# Patient Record
Sex: FEMALE | Race: WHITE | NOT HISPANIC OR LATINO
[De-identification: names, ages, dates, MRNs, and addresses within clinical notes are randomized per-mention and may not be internally consistent; named-entity substitution may affect disease eponyms.]

---

## 2017-03-06 ENCOUNTER — RX RENEWAL (OUTPATIENT)
Age: 72
End: 2017-03-06

## 2017-03-21 ENCOUNTER — APPOINTMENT (OUTPATIENT)
Dept: ENDOCRINOLOGY | Facility: CLINIC | Age: 72
End: 2017-03-21

## 2017-03-21 VITALS
WEIGHT: 152 LBS | HEART RATE: 88 BPM | HEIGHT: 62 IN | OXYGEN SATURATION: 95 % | BODY MASS INDEX: 27.97 KG/M2 | DIASTOLIC BLOOD PRESSURE: 70 MMHG | SYSTOLIC BLOOD PRESSURE: 130 MMHG

## 2017-03-21 DIAGNOSIS — I10 ESSENTIAL (PRIMARY) HYPERTENSION: ICD-10-CM

## 2017-03-21 DIAGNOSIS — E78.5 HYPERLIPIDEMIA, UNSPECIFIED: ICD-10-CM

## 2017-03-21 DIAGNOSIS — E11.65 TYPE 2 DIABETES MELLITUS WITH HYPERGLYCEMIA: ICD-10-CM

## 2017-03-21 LAB
GLUCOSE BLDC GLUCOMTR-MCNC: 125
HBA1C MFR BLD HPLC: 8

## 2017-03-29 ENCOUNTER — APPOINTMENT (OUTPATIENT)
Dept: CARDIOLOGY | Facility: CLINIC | Age: 72
End: 2017-03-29

## 2017-03-29 ENCOUNTER — NON-APPOINTMENT (OUTPATIENT)
Age: 72
End: 2017-03-29

## 2017-03-29 VITALS
HEART RATE: 77 BPM | DIASTOLIC BLOOD PRESSURE: 80 MMHG | BODY MASS INDEX: 27.97 KG/M2 | OXYGEN SATURATION: 100 % | SYSTOLIC BLOOD PRESSURE: 164 MMHG | HEIGHT: 62 IN | WEIGHT: 152 LBS

## 2017-05-02 ENCOUNTER — RX RENEWAL (OUTPATIENT)
Age: 72
End: 2017-05-02

## 2017-06-06 ENCOUNTER — APPOINTMENT (OUTPATIENT)
Dept: ENDOCRINOLOGY | Facility: CLINIC | Age: 72
End: 2017-06-06

## 2017-06-29 ENCOUNTER — RX RENEWAL (OUTPATIENT)
Age: 72
End: 2017-06-29

## 2017-09-25 ENCOUNTER — RX RENEWAL (OUTPATIENT)
Age: 72
End: 2017-09-25

## 2017-11-28 ENCOUNTER — RX RENEWAL (OUTPATIENT)
Age: 72
End: 2017-11-28

## 2018-04-04 ENCOUNTER — APPOINTMENT (OUTPATIENT)
Dept: CARDIOLOGY | Facility: CLINIC | Age: 73
End: 2018-04-04
Payer: MEDICARE

## 2018-04-04 ENCOUNTER — NON-APPOINTMENT (OUTPATIENT)
Age: 73
End: 2018-04-04

## 2018-04-04 VITALS
HEART RATE: 84 BPM | RESPIRATION RATE: 17 BRPM | DIASTOLIC BLOOD PRESSURE: 75 MMHG | OXYGEN SATURATION: 97 % | SYSTOLIC BLOOD PRESSURE: 172 MMHG

## 2018-04-04 VITALS
DIASTOLIC BLOOD PRESSURE: 82 MMHG | HEART RATE: 82 BPM | SYSTOLIC BLOOD PRESSURE: 161 MMHG | WEIGHT: 153 LBS | OXYGEN SATURATION: 97 % | HEIGHT: 61 IN | BODY MASS INDEX: 28.89 KG/M2 | RESPIRATION RATE: 17 BRPM

## 2018-04-04 VITALS — RESPIRATION RATE: 17 BRPM

## 2018-04-04 PROCEDURE — 99214 OFFICE O/P EST MOD 30 MIN: CPT

## 2018-04-04 PROCEDURE — 93000 ELECTROCARDIOGRAM COMPLETE: CPT

## 2018-04-04 RX ORDER — ASPIRIN 81 MG/1
81 TABLET, CHEWABLE ORAL DAILY
Qty: 1 | Refills: 3 | Status: ACTIVE | COMMUNITY
Start: 2018-04-04 | End: 1900-01-01

## 2018-04-04 RX ORDER — LISINOPRIL 40 MG/1
40 TABLET ORAL DAILY
Qty: 90 | Refills: 3 | Status: ACTIVE | COMMUNITY
Start: 1900-01-01 | End: 1900-01-01

## 2018-04-06 LAB
ALBUMIN SERPL ELPH-MCNC: 4.1 G/DL
ALP BLD-CCNC: 62 U/L
ALT SERPL-CCNC: 14 U/L
ANION GAP SERPL CALC-SCNC: 14 MMOL/L
AST SERPL-CCNC: 15 U/L
BILIRUB SERPL-MCNC: 0.3 MG/DL
BUN SERPL-MCNC: 30 MG/DL
CALCIUM SERPL-MCNC: 9.9 MG/DL
CHLORIDE SERPL-SCNC: 105 MMOL/L
CO2 SERPL-SCNC: 23 MMOL/L
CREAT SERPL-MCNC: 1.17 MG/DL
GLUCOSE SERPL-MCNC: 231 MG/DL
POTASSIUM SERPL-SCNC: 5.2 MMOL/L
PROT SERPL-MCNC: 7.7 G/DL
SODIUM SERPL-SCNC: 142 MMOL/L

## 2018-04-17 ENCOUNTER — RX RENEWAL (OUTPATIENT)
Age: 73
End: 2018-04-17

## 2018-07-27 ENCOUNTER — RX RENEWAL (OUTPATIENT)
Age: 73
End: 2018-07-27

## 2018-07-27 RX ORDER — ATORVASTATIN CALCIUM 10 MG/1
10 TABLET, FILM COATED ORAL
Qty: 30 | Refills: 3 | Status: ACTIVE | COMMUNITY
Start: 2018-04-06 | End: 1900-01-01

## 2018-09-19 ENCOUNTER — RX RENEWAL (OUTPATIENT)
Age: 73
End: 2018-09-19

## 2018-09-19 RX ORDER — CARVEDILOL 6.25 MG/1
6.25 TABLET, FILM COATED ORAL TWICE DAILY
Qty: 180 | Refills: 1 | Status: ACTIVE | COMMUNITY
Start: 2017-03-29 | End: 1900-01-01

## 2018-11-14 ENCOUNTER — APPOINTMENT (OUTPATIENT)
Dept: CARDIOLOGY | Facility: CLINIC | Age: 73
End: 2018-11-14
Payer: MEDICARE

## 2018-11-14 ENCOUNTER — NON-APPOINTMENT (OUTPATIENT)
Age: 73
End: 2018-11-14

## 2018-11-14 VITALS
BODY MASS INDEX: 28.32 KG/M2 | WEIGHT: 150 LBS | HEART RATE: 82 BPM | OXYGEN SATURATION: 100 % | SYSTOLIC BLOOD PRESSURE: 168 MMHG | DIASTOLIC BLOOD PRESSURE: 76 MMHG | HEIGHT: 61 IN

## 2018-11-14 PROCEDURE — 93000 ELECTROCARDIOGRAM COMPLETE: CPT

## 2018-11-14 PROCEDURE — 99214 OFFICE O/P EST MOD 30 MIN: CPT

## 2018-11-14 NOTE — PHYSICAL EXAM
[General Appearance - Well Developed] : well developed [General Appearance - Well Nourished] : well nourished [Normal Conjunctiva] : the conjunctiva exhibited no abnormalities [Normal Oral Mucosa] : normal oral mucosa [Normal Oropharynx] : normal oropharynx [Normal Jugular Venous V Waves Present] : normal jugular venous V waves present [Respiration, Rhythm And Depth] : normal respiratory rhythm and effort [Heart Rate And Rhythm] : heart rate and rhythm were normal [Heart Sounds] : normal S1 and S2 [Bowel Sounds] : normal bowel sounds [Abdomen Soft] : soft [Abdomen Tenderness] : non-tender [Abnormal Walk] : normal gait [Nail Clubbing] : no clubbing of the fingernails [Cyanosis, Localized] : no localized cyanosis [FreeTextEntry1] : Right radial access site is normal pulses one plus with no hematoma noted no muscle compromise. Mild bruising but overall looks very good [Skin Color & Pigmentation] : normal skin color and pigmentation [Skin Turgor] : normal skin turgor [] : no rash [Oriented To Time, Place, And Person] : oriented to person, place, and time [Impaired Insight] : insight and judgment were intact [No Anxiety] : not feeling anxious

## 2018-11-14 NOTE — DISCUSSION/SUMMARY
[FreeTextEntry1] : #1 CAD: asymptomatic. Continue with aspirin and aggressive risk factor modification. On low dose lipitor as she has elevated LFTS with high dose./ \par \par #2 hypertension:Blood pressure is elevated,patient states that she is always nervous in Drs office. At home BP was ok.  will continue current therapy, low salt diet and fu PCP . she will maintain home BP logs\par \par #Type 2 diabetes: followup with endocrinologist\par

## 2018-11-14 NOTE — HISTORY OF PRESENT ILLNESS
[FreeTextEntry1] : 73-year-old female with history of hypertension and type 2 diabetes. 2014 She was admitted to [facility] with non-ST elevation MI. Cardiac catheter showed severe stenosis of right coronary artery that was treated with drug-eluting stents.\par \par On today's visit patient states that she is doing well with no active symptoms of angina, shortness of breath, palpitations, syncope or presyncope. Does 10K steps everyday\par \par  BP is still high, there is also concern related to high salt diet.

## 2019-04-10 ENCOUNTER — EMERGENCY (EMERGENCY)
Facility: HOSPITAL | Age: 74
LOS: 1 days | Discharge: HOME | End: 2019-04-10
Attending: EMERGENCY MEDICINE | Admitting: EMERGENCY MEDICINE
Payer: MEDICARE

## 2019-04-10 VITALS
TEMPERATURE: 98 F | RESPIRATION RATE: 16 BRPM | HEART RATE: 92 BPM | OXYGEN SATURATION: 95 % | DIASTOLIC BLOOD PRESSURE: 71 MMHG | SYSTOLIC BLOOD PRESSURE: 160 MMHG

## 2019-04-10 VITALS
HEART RATE: 89 BPM | OXYGEN SATURATION: 98 % | RESPIRATION RATE: 15 BRPM | TEMPERATURE: 99 F | WEIGHT: 149.91 LBS | HEIGHT: 62 IN | DIASTOLIC BLOOD PRESSURE: 82 MMHG | SYSTOLIC BLOOD PRESSURE: 174 MMHG

## 2019-04-10 PROCEDURE — 99284 EMERGENCY DEPT VISIT MOD MDM: CPT

## 2019-04-10 PROCEDURE — 70450 CT HEAD/BRAIN W/O DYE: CPT | Mod: 26

## 2019-04-10 PROCEDURE — 72125 CT NECK SPINE W/O DYE: CPT

## 2019-04-10 PROCEDURE — 71250 CT THORAX DX C-: CPT

## 2019-04-10 PROCEDURE — 72125 CT NECK SPINE W/O DYE: CPT | Mod: 26

## 2019-04-10 PROCEDURE — 71250 CT THORAX DX C-: CPT | Mod: 26

## 2019-04-10 PROCEDURE — 73110 X-RAY EXAM OF WRIST: CPT | Mod: 26,RT

## 2019-04-10 PROCEDURE — 90715 TDAP VACCINE 7 YRS/> IM: CPT

## 2019-04-10 PROCEDURE — 99284 EMERGENCY DEPT VISIT MOD MDM: CPT | Mod: 25

## 2019-04-10 PROCEDURE — 70450 CT HEAD/BRAIN W/O DYE: CPT

## 2019-04-10 PROCEDURE — 90471 IMMUNIZATION ADMIN: CPT

## 2019-04-10 PROCEDURE — 73110 X-RAY EXAM OF WRIST: CPT

## 2019-04-10 PROCEDURE — 70480 CT ORBIT/EAR/FOSSA W/O DYE: CPT

## 2019-04-10 RX ORDER — TETANUS TOXOID, REDUCED DIPHTHERIA TOXOID AND ACELLULAR PERTUSSIS VACCINE, ADSORBED 5; 2.5; 8; 8; 2.5 [IU]/.5ML; [IU]/.5ML; UG/.5ML; UG/.5ML; UG/.5ML
0.5 SUSPENSION INTRAMUSCULAR ONCE
Qty: 0 | Refills: 0 | Status: COMPLETED | OUTPATIENT
Start: 2019-04-10 | End: 2019-04-10

## 2019-04-10 RX ORDER — ACETAMINOPHEN 500 MG
975 TABLET ORAL ONCE
Qty: 0 | Refills: 0 | Status: COMPLETED | OUTPATIENT
Start: 2019-04-10 | End: 2019-04-10

## 2019-04-10 RX ADMIN — TETANUS TOXOID, REDUCED DIPHTHERIA TOXOID AND ACELLULAR PERTUSSIS VACCINE, ADSORBED 0.5 MILLILITER(S): 5; 2.5; 8; 8; 2.5 SUSPENSION INTRAMUSCULAR at 23:16

## 2019-04-10 RX ADMIN — Medication 975 MILLIGRAM(S): at 22:50

## 2019-04-10 RX ADMIN — Medication 975 MILLIGRAM(S): at 23:05

## 2019-04-10 NOTE — ED PROVIDER NOTE - PROGRESS NOTE DETAILS
giving tylenol now, having more pain from left periorbital hematoma; also family now request tetanus shot, probably years since last and has abrasions hands and right foot from fall outside, so want the vaccine

## 2019-04-10 NOTE — ED PROVIDER NOTE - OBJECTIVE STATEMENT
72 y/o female with a PMHx of HTN, DM, CAD presents to the ED s/p fall today. Pt was taking the garbage out, usually doesn't take out garbage. Pt has a steep 2 car driveway, per son pt probably tripped and fell, and tumbled alf down the driveway. No LOC, was able to ambulate back into house. Family members were in the house when pt fell. Now c/o left-sided facial bruising and edema, left-sided chest pain, abrasion on bilateral knees. Some neck discomfort. Denies back pain. Probably still on blood thinners. Hx of lasic eye surgery. Nonsmoker.

## 2019-04-10 NOTE — ED ADULT NURSE NOTE - NSIMPLEMENTINTERV_GEN_ALL_ED
Implemented All Fall Risk Interventions:  [place] to call system. Call bell, personal items and telephone within reach. Instruct patient to call for assistance. Room bathroom lighting operational. Non-slip footwear when patient is off stretcher. Physically safe environment: no spills, clutter or unnecessary equipment. Stretcher in lowest position, wheels locked, appropriate side rails in place. Provide visual cue, wrist band, yellow gown, etc. Monitor gait and stability. Monitor for mental status changes and reorient to person, place, and time. Review medications for side effects contributing to fall risk. Reinforce activity limits and safety measures with patient and family.

## 2019-04-10 NOTE — ED ADULT TRIAGE NOTE - CHIEF COMPLAINT QUOTE
as per pt's son, patient lost her balance and fell, hit her left side face on the drive way, noted a large hematoma on her face. also c/o left side pain

## 2019-04-10 NOTE — ED PROVIDER NOTE - CARE PLAN
Principal Discharge DX:	Periorbital hematoma of left eye  Secondary Diagnosis:	Contusion of chest wall, left, initial encounter  Secondary Diagnosis:	Abrasions of multiple sites

## 2019-11-27 ENCOUNTER — APPOINTMENT (OUTPATIENT)
Dept: CARDIOLOGY | Facility: CLINIC | Age: 74
End: 2019-11-27
Payer: MEDICARE

## 2019-11-27 ENCOUNTER — NON-APPOINTMENT (OUTPATIENT)
Age: 74
End: 2019-11-27

## 2019-11-27 VITALS
SYSTOLIC BLOOD PRESSURE: 164 MMHG | HEART RATE: 80 BPM | HEIGHT: 61 IN | OXYGEN SATURATION: 98 % | DIASTOLIC BLOOD PRESSURE: 73 MMHG | WEIGHT: 149 LBS | BODY MASS INDEX: 28.13 KG/M2

## 2019-11-27 PROCEDURE — 93000 ELECTROCARDIOGRAM COMPLETE: CPT

## 2019-11-27 PROCEDURE — 99214 OFFICE O/P EST MOD 30 MIN: CPT

## 2019-11-27 NOTE — DISCUSSION/SUMMARY
[FreeTextEntry1] : #1 CAD: atypical symptoms, will get NST to assess for ischemia. \par \par #2 hypertension:Blood pressure is elevated,patient states that she is always nervous in Drs office. At home BP was ok.  will continue current therapy, low salt diet and fu PCP . she will maintain home BP logs\par \par #Type 2 diabetes: followup with endocrinologist\par

## 2019-11-27 NOTE — HISTORY OF PRESENT ILLNESS
[FreeTextEntry1] : 74-year-old female with history of hypertension and type 2 diabetes. 2014 She was admitted to [facility] with non-ST elevation MI. Cardiac catheter showed severe stenosis of right coronary artery that was treated with drug-eluting stents.\par \par On today's visit patient states she gest on-off left left side chest pain, dull in nature and ocassionally she gets SOb withj walking. \par \par  BP is still high, there is also concern related to high salt diet. /71

## 2019-12-23 ENCOUNTER — APPOINTMENT (OUTPATIENT)
Dept: CV DIAGNOSTICS | Facility: HOSPITAL | Age: 74
End: 2019-12-23
Payer: MEDICARE

## 2019-12-23 ENCOUNTER — OUTPATIENT (OUTPATIENT)
Dept: OUTPATIENT SERVICES | Facility: HOSPITAL | Age: 74
LOS: 1 days | End: 2019-12-23

## 2019-12-23 DIAGNOSIS — I25.10 ATHEROSCLEROTIC HEART DISEASE OF NATIVE CORONARY ARTERY WITHOUT ANGINA PECTORIS: ICD-10-CM

## 2019-12-23 PROCEDURE — 93018 CV STRESS TEST I&R ONLY: CPT | Mod: GC

## 2019-12-23 PROCEDURE — 93016 CV STRESS TEST SUPVJ ONLY: CPT | Mod: GC

## 2019-12-23 PROCEDURE — 78452 HT MUSCLE IMAGE SPECT MULT: CPT | Mod: 26

## 2019-12-30 ENCOUNTER — EMERGENCY (EMERGENCY)
Facility: HOSPITAL | Age: 74
LOS: 1 days | Discharge: HOME | End: 2019-12-30
Attending: EMERGENCY MEDICINE | Admitting: EMERGENCY MEDICINE
Payer: MEDICARE

## 2019-12-30 VITALS
OXYGEN SATURATION: 99 % | TEMPERATURE: 98 F | DIASTOLIC BLOOD PRESSURE: 62 MMHG | RESPIRATION RATE: 16 BRPM | SYSTOLIC BLOOD PRESSURE: 153 MMHG | HEART RATE: 71 BPM

## 2019-12-30 VITALS
HEART RATE: 72 BPM | DIASTOLIC BLOOD PRESSURE: 90 MMHG | SYSTOLIC BLOOD PRESSURE: 209 MMHG | OXYGEN SATURATION: 99 % | WEIGHT: 145.06 LBS | HEIGHT: 61 IN | TEMPERATURE: 98 F | RESPIRATION RATE: 18 BRPM

## 2019-12-30 LAB
ALBUMIN SERPL ELPH-MCNC: 3.8 G/DL — SIGNIFICANT CHANGE UP (ref 3.3–5)
ALP SERPL-CCNC: 76 U/L — SIGNIFICANT CHANGE UP (ref 30–120)
ALT FLD-CCNC: 19 U/L DA — SIGNIFICANT CHANGE UP (ref 10–60)
ANION GAP SERPL CALC-SCNC: 9 MMOL/L — SIGNIFICANT CHANGE UP (ref 5–17)
AST SERPL-CCNC: 24 U/L — SIGNIFICANT CHANGE UP (ref 10–40)
BASOPHILS # BLD AUTO: 0.02 K/UL — SIGNIFICANT CHANGE UP (ref 0–0.2)
BASOPHILS NFR BLD AUTO: 0.5 % — SIGNIFICANT CHANGE UP (ref 0–2)
BILIRUB SERPL-MCNC: 0.4 MG/DL — SIGNIFICANT CHANGE UP (ref 0.2–1.2)
BUN SERPL-MCNC: 29 MG/DL — HIGH (ref 7–23)
CALCIUM SERPL-MCNC: 9 MG/DL — SIGNIFICANT CHANGE UP (ref 8.4–10.5)
CHLORIDE SERPL-SCNC: 102 MMOL/L — SIGNIFICANT CHANGE UP (ref 96–108)
CO2 SERPL-SCNC: 26 MMOL/L — SIGNIFICANT CHANGE UP (ref 22–31)
CREAT SERPL-MCNC: 1.1 MG/DL — SIGNIFICANT CHANGE UP (ref 0.5–1.3)
EOSINOPHIL # BLD AUTO: 0.18 K/UL — SIGNIFICANT CHANGE UP (ref 0–0.5)
EOSINOPHIL NFR BLD AUTO: 4.3 % — SIGNIFICANT CHANGE UP (ref 0–6)
GLUCOSE BLDC GLUCOMTR-MCNC: 127 MG/DL — HIGH (ref 70–99)
GLUCOSE SERPL-MCNC: 134 MG/DL — HIGH (ref 70–99)
HCT VFR BLD CALC: 34.5 % — SIGNIFICANT CHANGE UP (ref 34.5–45)
HGB BLD-MCNC: 11 G/DL — LOW (ref 11.5–15.5)
IMM GRANULOCYTES NFR BLD AUTO: 0.2 % — SIGNIFICANT CHANGE UP (ref 0–1.5)
LYMPHOCYTES # BLD AUTO: 1.39 K/UL — SIGNIFICANT CHANGE UP (ref 1–3.3)
LYMPHOCYTES # BLD AUTO: 32.9 % — SIGNIFICANT CHANGE UP (ref 13–44)
MAGNESIUM SERPL-MCNC: 1.7 MG/DL — SIGNIFICANT CHANGE UP (ref 1.6–2.6)
MCHC RBC-ENTMCNC: 30.3 PG — SIGNIFICANT CHANGE UP (ref 27–34)
MCHC RBC-ENTMCNC: 31.9 GM/DL — LOW (ref 32–36)
MCV RBC AUTO: 95 FL — SIGNIFICANT CHANGE UP (ref 80–100)
MONOCYTES # BLD AUTO: 0.32 K/UL — SIGNIFICANT CHANGE UP (ref 0–0.9)
MONOCYTES NFR BLD AUTO: 7.6 % — SIGNIFICANT CHANGE UP (ref 2–14)
NEUTROPHILS # BLD AUTO: 2.31 K/UL — SIGNIFICANT CHANGE UP (ref 1.8–7.4)
NEUTROPHILS NFR BLD AUTO: 54.5 % — SIGNIFICANT CHANGE UP (ref 43–77)
NRBC # BLD: 0 /100 WBCS — SIGNIFICANT CHANGE UP (ref 0–0)
PHOSPHATE SERPL-MCNC: 3.5 MG/DL — SIGNIFICANT CHANGE UP (ref 2.5–4.5)
PLATELET # BLD AUTO: 214 K/UL — SIGNIFICANT CHANGE UP (ref 150–400)
POTASSIUM SERPL-MCNC: 5.1 MMOL/L — SIGNIFICANT CHANGE UP (ref 3.5–5.3)
POTASSIUM SERPL-SCNC: 5.1 MMOL/L — SIGNIFICANT CHANGE UP (ref 3.5–5.3)
PROT SERPL-MCNC: 8.1 G/DL — SIGNIFICANT CHANGE UP (ref 6–8.3)
RBC # BLD: 3.63 M/UL — LOW (ref 3.8–5.2)
RBC # FLD: 13.2 % — SIGNIFICANT CHANGE UP (ref 10.3–14.5)
SODIUM SERPL-SCNC: 137 MMOL/L — SIGNIFICANT CHANGE UP (ref 135–145)
TROPONIN I SERPL-MCNC: 0 NG/ML — LOW (ref 0.02–0.06)
WBC # BLD: 4.23 K/UL — SIGNIFICANT CHANGE UP (ref 3.8–10.5)
WBC # FLD AUTO: 4.23 K/UL — SIGNIFICANT CHANGE UP (ref 3.8–10.5)

## 2019-12-30 PROCEDURE — 36415 COLL VENOUS BLD VENIPUNCTURE: CPT

## 2019-12-30 PROCEDURE — 96374 THER/PROPH/DIAG INJ IV PUSH: CPT

## 2019-12-30 PROCEDURE — 82962 GLUCOSE BLOOD TEST: CPT

## 2019-12-30 PROCEDURE — 70450 CT HEAD/BRAIN W/O DYE: CPT | Mod: 26

## 2019-12-30 PROCEDURE — 84484 ASSAY OF TROPONIN QUANT: CPT

## 2019-12-30 PROCEDURE — 99284 EMERGENCY DEPT VISIT MOD MDM: CPT | Mod: 25

## 2019-12-30 PROCEDURE — 99284 EMERGENCY DEPT VISIT MOD MDM: CPT

## 2019-12-30 PROCEDURE — 93010 ELECTROCARDIOGRAM REPORT: CPT

## 2019-12-30 PROCEDURE — 96361 HYDRATE IV INFUSION ADD-ON: CPT

## 2019-12-30 PROCEDURE — 84100 ASSAY OF PHOSPHORUS: CPT

## 2019-12-30 PROCEDURE — 80053 COMPREHEN METABOLIC PANEL: CPT

## 2019-12-30 PROCEDURE — 93005 ELECTROCARDIOGRAM TRACING: CPT

## 2019-12-30 PROCEDURE — 85027 COMPLETE CBC AUTOMATED: CPT

## 2019-12-30 PROCEDURE — 83735 ASSAY OF MAGNESIUM: CPT

## 2019-12-30 PROCEDURE — 70450 CT HEAD/BRAIN W/O DYE: CPT

## 2019-12-30 RX ORDER — ATORVASTATIN CALCIUM 80 MG/1
1 TABLET, FILM COATED ORAL
Qty: 0 | Refills: 0 | DISCHARGE

## 2019-12-30 RX ORDER — MECLIZINE HCL 12.5 MG
1 TABLET ORAL
Qty: 20 | Refills: 0
Start: 2019-12-30

## 2019-12-30 RX ORDER — CLOPIDOGREL BISULFATE 75 MG/1
1 TABLET, FILM COATED ORAL
Qty: 0 | Refills: 0 | DISCHARGE

## 2019-12-30 RX ORDER — ONDANSETRON 8 MG/1
4 TABLET, FILM COATED ORAL ONCE
Refills: 0 | Status: COMPLETED | OUTPATIENT
Start: 2019-12-30 | End: 2019-12-30

## 2019-12-30 RX ORDER — ASPIRIN/CALCIUM CARB/MAGNESIUM 324 MG
1 TABLET ORAL
Qty: 0 | Refills: 0 | DISCHARGE

## 2019-12-30 RX ORDER — CARVEDILOL PHOSPHATE 80 MG/1
1 CAPSULE, EXTENDED RELEASE ORAL
Qty: 0 | Refills: 0 | DISCHARGE

## 2019-12-30 RX ORDER — SAXAGLIPTIN 5 MG/1
1 TABLET, FILM COATED ORAL
Qty: 0 | Refills: 0 | DISCHARGE

## 2019-12-30 RX ORDER — SODIUM CHLORIDE 9 MG/ML
1000 INJECTION INTRAMUSCULAR; INTRAVENOUS; SUBCUTANEOUS ONCE
Refills: 0 | Status: COMPLETED | OUTPATIENT
Start: 2019-12-30 | End: 2019-12-30

## 2019-12-30 RX ORDER — MECLIZINE HCL 12.5 MG
25 TABLET ORAL ONCE
Refills: 0 | Status: COMPLETED | OUTPATIENT
Start: 2019-12-30 | End: 2019-12-30

## 2019-12-30 RX ORDER — LISINOPRIL 2.5 MG/1
1 TABLET ORAL
Qty: 0 | Refills: 0 | DISCHARGE

## 2019-12-30 RX ORDER — METFORMIN HYDROCHLORIDE 850 MG/1
1 TABLET ORAL
Qty: 0 | Refills: 0 | DISCHARGE

## 2019-12-30 RX ADMIN — Medication 25 MILLIGRAM(S): at 10:08

## 2019-12-30 RX ADMIN — SODIUM CHLORIDE 1000 MILLILITER(S): 9 INJECTION INTRAMUSCULAR; INTRAVENOUS; SUBCUTANEOUS at 11:10

## 2019-12-30 RX ADMIN — SODIUM CHLORIDE 1000 MILLILITER(S): 9 INJECTION INTRAMUSCULAR; INTRAVENOUS; SUBCUTANEOUS at 10:08

## 2019-12-30 RX ADMIN — ONDANSETRON 4 MILLIGRAM(S): 8 TABLET, FILM COATED ORAL at 10:08

## 2019-12-30 NOTE — ED PROVIDER NOTE - CLINICAL SUMMARY MEDICAL DECISION MAKING FREE TEXT BOX
Dr. Milner Note: acute vertigo but in a patient with high risk factors for vascular event given HTN, DM, CAD, and elderly state.  Will check lytes, troponin, and ct head, and refer to neuro for possible MRI imaging but otherwise consider more BPPV.

## 2019-12-30 NOTE — ED PROVIDER NOTE - NSFOLLOWUPINSTRUCTIONS_ED_ALL_ED_FT
1. Take Meclizine 25mg every 6 hours for dizziness.   2. Follow up PCP in 1-2 days for re-evaluation.  3. Follow up Neurologist if dizziness returns/persists to exclude occult stroke.  Today's exam was negative for stroke but you are high risk so would recommend further work up and risk reduction by your doctor and neurologist.  4. Return for any concerns.

## 2019-12-30 NOTE — ED PROVIDER NOTE - OBJECTIVE STATEMENT
Dr. Milner Note: 74F here with daughter, walk-in, presents with acute vertigo, onset 8hrs ago, acute, associated with nausea.  Also noted SBP went from 160 to 200 at home.  Daughter incorrectly states pt had a prior stroke with similar symptoms (clarified that pt had angioplasty and 2 stents for CAD with symptoms of nausea and heartburn).  Pt currently without chest pain, dyspnea, or palpitations.  Vertigo only present with head movements or getting up, and resolved with lying still.  States she's on a "blood thinner" but has no list of medications.

## 2019-12-30 NOTE — ED PROVIDER NOTE - PATIENT PORTAL LINK FT
You can access the FollowMyHealth Patient Portal offered by [facility] by registering at the following website: [email or web address]. By joining [employer]’s FollowMyHealth portal, you will also be able to view your health information using other applications (apps) compatible with our system.

## 2019-12-30 NOTE — ED ADULT NURSE NOTE - NSIMPLEMENTINTERV_GEN_ALL_ED
Implemented All Fall with Harm Risk Interventions:  [place] to call system. Call bell, personal items and telephone within reach. Instruct patient to call for assistance. Room bathroom lighting operational. Non-slip footwear when patient is off stretcher. Physically safe environment: no spills, clutter or unnecessary equipment. Stretcher in lowest position, wheels locked, appropriate side rails in place. Provide visual cue, wrist band, yellow gown, etc. Monitor gait and stability. Monitor for mental status changes and reorient to person, place, and time. Review medications for side effects contributing to fall risk. Reinforce activity limits and safety measures with patient and family. Provide visual clues: red socks.

## 2019-12-30 NOTE — ED ADULT NURSE NOTE - OBJECTIVE STATEMENT
Amb to ED with her daughter who states pt became dizzy about 12M with nausea- no vomiting. Color fair. Skin warm & dry to touch. Lungs clear. ALLISON freely

## 2019-12-30 NOTE — ED PROVIDER NOTE - CARE PLAN
Principal Discharge DX:	BPPV (benign paroxysmal positional vertigo), left  Secondary Diagnosis:	Hypertension

## 2022-12-17 ENCOUNTER — OFFICE (OUTPATIENT)
Dept: URBAN - METROPOLITAN AREA CLINIC 109 | Facility: CLINIC | Age: 77
Setting detail: OPHTHALMOLOGY
End: 2022-12-17
Payer: MEDICARE

## 2022-12-17 VITALS — HEIGHT: 55 IN

## 2022-12-17 DIAGNOSIS — H01.002: ICD-10-CM

## 2022-12-17 DIAGNOSIS — H40.053: ICD-10-CM

## 2022-12-17 DIAGNOSIS — H01.004: ICD-10-CM

## 2022-12-17 DIAGNOSIS — H01.005: ICD-10-CM

## 2022-12-17 DIAGNOSIS — H01.001: ICD-10-CM

## 2022-12-17 DIAGNOSIS — E11.3591: ICD-10-CM

## 2022-12-17 DIAGNOSIS — E11.3592: ICD-10-CM

## 2022-12-17 PROCEDURE — 92014 COMPRE OPH EXAM EST PT 1/>: CPT | Performed by: OPHTHALMOLOGY

## 2022-12-17 PROCEDURE — 92134 CPTRZ OPH DX IMG PST SGM RTA: CPT | Performed by: OPHTHALMOLOGY

## 2022-12-17 ASSESSMENT — REFRACTION_AUTOREFRACTION
OS_AXIS: 69
OD_CYLINDER: -1.00
OS_SPHERE: +1.50
OD_SPHERE: +1.50
OS_CYLINDER: -0.75
OD_AXIS: 101

## 2022-12-17 ASSESSMENT — TONOMETRY
OS_IOP_MMHG: 19
OD_IOP_MMHG: 19

## 2022-12-17 ASSESSMENT — PACHYMETRY
OS_CT_UM: 603
OD_CT_CORRECTION: -1
OS_CT_CORRECTION: -4
OD_CT_UM: 565

## 2022-12-17 ASSESSMENT — KERATOMETRY
OD_AXISANGLE_DEGREES: 158
OS_K1POWER_DIOPTERS: 42.87
OS_K2POWER_DIOPTERS: 44.25
OD_K2POWER_DIOPTERS: 44.00
OS_AXISANGLE_DEGREES: 3
OD_K1POWER_DIOPTERS: 43.62

## 2022-12-17 ASSESSMENT — LID EXAM ASSESSMENTS
OS_MEIBOMITIS: 1+
OD_BLEPHARITIS: 2+
OS_BLEPHARITIS: 2+
OD_MEIBOMITIS: 1+

## 2022-12-17 ASSESSMENT — REFRACTION_CURRENTRX
OS_OVR_VA: 20/
OD_ADD: +1.75
OD_OVR_VA: 20/
OS_AXIS: 102
OD_AXIS: 76
OS_SPHERE: +1.50
OD_CYLINDER: -1.50
OD_SPHERE: +2.50
OS_CYLINDER: -0.75
OS_ADD: +1.75

## 2022-12-17 ASSESSMENT — AXIALLENGTH_DERIVED
OS_AL: 23.1404
OD_AL: 23.099

## 2022-12-17 ASSESSMENT — VISUAL ACUITY
OD_BCVA: 20/20
OS_BCVA: 20/30

## 2022-12-17 ASSESSMENT — CONFRONTATIONAL VISUAL FIELD TEST (CVF)
OS_FINDINGS: FULL
OD_FINDINGS: FULL

## 2022-12-17 ASSESSMENT — SPHEQUIV_DERIVED
OD_SPHEQUIV: 1
OS_SPHEQUIV: 1.125

## 2023-08-30 ENCOUNTER — OFFICE (OUTPATIENT)
Dept: URBAN - METROPOLITAN AREA CLINIC 109 | Facility: CLINIC | Age: 78
Setting detail: OPHTHALMOLOGY
End: 2023-08-30
Payer: MEDICARE

## 2023-08-30 DIAGNOSIS — H01.004: ICD-10-CM

## 2023-08-30 DIAGNOSIS — E11.3592: ICD-10-CM

## 2023-08-30 DIAGNOSIS — H01.002: ICD-10-CM

## 2023-08-30 DIAGNOSIS — E11.3591: ICD-10-CM

## 2023-08-30 DIAGNOSIS — H40.053: ICD-10-CM

## 2023-08-30 DIAGNOSIS — H01.001: ICD-10-CM

## 2023-08-30 DIAGNOSIS — H43.11: ICD-10-CM

## 2023-08-30 DIAGNOSIS — H01.005: ICD-10-CM

## 2023-08-30 PROCEDURE — 92250 FUNDUS PHOTOGRAPHY W/I&R: CPT | Performed by: OPHTHALMOLOGY

## 2023-08-30 PROCEDURE — 92020 GONIOSCOPY: CPT | Performed by: OPHTHALMOLOGY

## 2023-08-30 PROCEDURE — 99213 OFFICE O/P EST LOW 20 MIN: CPT | Performed by: OPHTHALMOLOGY

## 2023-08-30 ASSESSMENT — REFRACTION_AUTOREFRACTION
OS_CYLINDER: -1.50
OD_AXIS: 88
OS_SPHERE: +1.75
OD_CYLINDER: -1.75
OS_AXIS: 81
OD_SPHERE: +2.50

## 2023-08-30 ASSESSMENT — TONOMETRY
OD_IOP_MMHG: 17
OS_IOP_MMHG: 17

## 2023-08-30 ASSESSMENT — SPHEQUIV_DERIVED
OD_SPHEQUIV: 1.625
OS_SPHEQUIV: 1

## 2023-08-30 ASSESSMENT — KERATOMETRY
OS_K2POWER_DIOPTERS: 44.25
OS_K1POWER_DIOPTERS: 42.87
OS_AXISANGLE_DEGREES: 3
OD_K1POWER_DIOPTERS: 43.62
OD_K2POWER_DIOPTERS: 44.00
OD_AXISANGLE_DEGREES: 158

## 2023-08-30 ASSESSMENT — AXIALLENGTH_DERIVED
OD_AL: 22.87
OS_AL: 23.1873

## 2023-08-30 ASSESSMENT — LID EXAM ASSESSMENTS
OD_MEIBOMITIS: 1+
OD_BLEPHARITIS: 2+
OS_BLEPHARITIS: 2+
OS_MEIBOMITIS: 1+

## 2023-08-30 ASSESSMENT — REFRACTION_CURRENTRX
OD_ADD: +1.75
OD_AXIS: 76
OD_CYLINDER: -1.50
OD_OVR_VA: 20/
OS_ADD: +1.75
OS_CYLINDER: -0.75
OS_AXIS: 102
OS_SPHERE: +1.50
OS_OVR_VA: 20/
OD_SPHERE: +2.50

## 2023-08-30 ASSESSMENT — CONFRONTATIONAL VISUAL FIELD TEST (CVF)
OS_FINDINGS: FULL
OD_FINDINGS: FULL

## 2023-08-30 ASSESSMENT — VISUAL ACUITY
OS_BCVA: 20/100
OD_BCVA: 20/20-2

## 2023-08-30 ASSESSMENT — PACHYMETRY
OS_CT_CORRECTION: -4
OD_CT_UM: 565
OD_CT_CORRECTION: -1
OS_CT_UM: 603

## 2023-08-31 ENCOUNTER — OFFICE (OUTPATIENT)
Dept: URBAN - METROPOLITAN AREA CLINIC 32 | Facility: CLINIC | Age: 78
Setting detail: OPHTHALMOLOGY
End: 2023-08-31
Payer: MEDICARE

## 2023-08-31 DIAGNOSIS — E11.3511: ICD-10-CM

## 2023-08-31 DIAGNOSIS — H43.11: ICD-10-CM

## 2023-08-31 DIAGNOSIS — H35.3132: ICD-10-CM

## 2023-08-31 DIAGNOSIS — E11.3512: ICD-10-CM

## 2023-08-31 DIAGNOSIS — H35.371: ICD-10-CM

## 2023-08-31 PROCEDURE — 92235 FLUORESCEIN ANGRPH MLTIFRAME: CPT | Performed by: OPHTHALMOLOGY

## 2023-08-31 PROCEDURE — 92134 CPTRZ OPH DX IMG PST SGM RTA: CPT | Performed by: OPHTHALMOLOGY

## 2023-08-31 PROCEDURE — 67028 INJECTION EYE DRUG: CPT | Performed by: OPHTHALMOLOGY

## 2023-08-31 PROCEDURE — 76512 OPH US DX B-SCAN: CPT | Performed by: OPHTHALMOLOGY

## 2023-08-31 PROCEDURE — 99213 OFFICE O/P EST LOW 20 MIN: CPT | Performed by: OPHTHALMOLOGY

## 2023-08-31 ASSESSMENT — LID EXAM ASSESSMENTS
OS_MEIBOMITIS: 1+
OS_BLEPHARITIS: 2+
OD_MEIBOMITIS: 1+
OD_BLEPHARITIS: 2+

## 2023-08-31 ASSESSMENT — REFRACTION_AUTOREFRACTION
OS_CYLINDER: -1.50
OD_CYLINDER: -1.75
OD_AXIS: 88
OS_AXIS: 81
OD_SPHERE: +2.50
OS_SPHERE: +1.75

## 2023-08-31 ASSESSMENT — SPHEQUIV_DERIVED
OS_SPHEQUIV: 1
OD_SPHEQUIV: 1.625

## 2023-08-31 ASSESSMENT — VISUAL ACUITY
OS_BCVA: 20/200
OD_BCVA: 20/30

## 2023-08-31 ASSESSMENT — CONFRONTATIONAL VISUAL FIELD TEST (CVF)
OD_FINDINGS: FULL
OS_FINDINGS: FULL

## 2023-09-28 ENCOUNTER — OFFICE (OUTPATIENT)
Dept: URBAN - METROPOLITAN AREA CLINIC 32 | Facility: CLINIC | Age: 78
Setting detail: OPHTHALMOLOGY
End: 2023-09-28
Payer: MEDICARE

## 2023-09-28 DIAGNOSIS — E11.3511: ICD-10-CM

## 2023-09-28 DIAGNOSIS — E11.3512: ICD-10-CM

## 2023-09-28 PROCEDURE — 67028 INJECTION EYE DRUG: CPT | Performed by: OPHTHALMOLOGY

## 2023-09-28 PROCEDURE — 92134 CPTRZ OPH DX IMG PST SGM RTA: CPT | Performed by: OPHTHALMOLOGY

## 2023-09-28 ASSESSMENT — LID EXAM ASSESSMENTS
OS_BLEPHARITIS: 2+
OS_MEIBOMITIS: 1+
OD_MEIBOMITIS: 1+
OD_BLEPHARITIS: 2+

## 2023-09-28 ASSESSMENT — AXIALLENGTH_DERIVED
OD_AL: 22.87
OS_AL: 23.1873

## 2023-09-28 ASSESSMENT — REFRACTION_AUTOREFRACTION
OS_AXIS: 81
OS_CYLINDER: -1.50
OD_CYLINDER: -1.75
OD_SPHERE: +2.50
OD_AXIS: 88
OS_SPHERE: +1.75

## 2023-09-28 ASSESSMENT — VISUAL ACUITY
OD_BCVA: 20/25-
OS_BCVA: 20/70

## 2023-09-28 ASSESSMENT — KERATOMETRY
OS_K1POWER_DIOPTERS: 42.87
OD_AXISANGLE_DEGREES: 158
OS_K2POWER_DIOPTERS: 44.25
OD_K1POWER_DIOPTERS: 43.62
OS_AXISANGLE_DEGREES: 3
OD_K2POWER_DIOPTERS: 44.00

## 2023-09-28 ASSESSMENT — SPHEQUIV_DERIVED
OD_SPHEQUIV: 1.625
OS_SPHEQUIV: 1

## 2023-11-16 ENCOUNTER — OFFICE (OUTPATIENT)
Dept: URBAN - METROPOLITAN AREA CLINIC 32 | Facility: CLINIC | Age: 78
Setting detail: OPHTHALMOLOGY
End: 2023-11-16
Payer: MEDICARE

## 2023-11-16 DIAGNOSIS — H35.371: ICD-10-CM

## 2023-11-16 DIAGNOSIS — E11.3511: ICD-10-CM

## 2023-11-16 PROCEDURE — 67028 INJECTION EYE DRUG: CPT | Mod: RT | Performed by: OPHTHALMOLOGY

## 2023-11-16 PROCEDURE — 92134 CPTRZ OPH DX IMG PST SGM RTA: CPT | Performed by: OPHTHALMOLOGY

## 2023-11-16 ASSESSMENT — REFRACTION_AUTOREFRACTION
OD_CYLINDER: -1.75
OD_SPHERE: +2.50
OS_CYLINDER: -1.50
OS_SPHERE: +1.75
OS_AXIS: 81
OD_AXIS: 88

## 2023-11-16 ASSESSMENT — CONFRONTATIONAL VISUAL FIELD TEST (CVF)
OS_FINDINGS: FULL
OD_FINDINGS: FULL

## 2023-11-16 ASSESSMENT — LID EXAM ASSESSMENTS
OS_BLEPHARITIS: 2+
OD_BLEPHARITIS: 2+
OS_MEIBOMITIS: 1+
OD_MEIBOMITIS: 1+

## 2023-11-16 ASSESSMENT — SPHEQUIV_DERIVED
OS_SPHEQUIV: 1
OD_SPHEQUIV: 1.625

## 2023-12-14 ENCOUNTER — OFFICE (OUTPATIENT)
Dept: URBAN - METROPOLITAN AREA CLINIC 32 | Facility: CLINIC | Age: 78
Setting detail: OPHTHALMOLOGY
End: 2023-12-14
Payer: MEDICARE

## 2023-12-14 DIAGNOSIS — E11.3511: ICD-10-CM

## 2023-12-14 PROCEDURE — 67028 INJECTION EYE DRUG: CPT | Mod: RT | Performed by: OPHTHALMOLOGY

## 2023-12-14 PROCEDURE — 92134 CPTRZ OPH DX IMG PST SGM RTA: CPT | Performed by: OPHTHALMOLOGY

## 2023-12-14 ASSESSMENT — REFRACTION_AUTOREFRACTION
OD_SPHERE: +2.50
OS_AXIS: 81
OD_CYLINDER: -1.75
OS_CYLINDER: -1.50
OS_SPHERE: +1.75
OD_AXIS: 88

## 2023-12-14 ASSESSMENT — LID EXAM ASSESSMENTS
OD_BLEPHARITIS: 2+
OS_BLEPHARITIS: 2+
OS_MEIBOMITIS: 1+
OD_MEIBOMITIS: 1+

## 2023-12-14 ASSESSMENT — SPHEQUIV_DERIVED
OD_SPHEQUIV: 1.625
OS_SPHEQUIV: 1

## 2023-12-14 ASSESSMENT — CONFRONTATIONAL VISUAL FIELD TEST (CVF)
OD_FINDINGS: FULL
OS_FINDINGS: FULL

## 2024-01-18 ENCOUNTER — OFFICE (OUTPATIENT)
Dept: URBAN - METROPOLITAN AREA CLINIC 32 | Facility: CLINIC | Age: 79
Setting detail: OPHTHALMOLOGY
End: 2024-01-18
Payer: MEDICARE

## 2024-01-18 DIAGNOSIS — H35.371: ICD-10-CM

## 2024-01-18 DIAGNOSIS — E11.3511: ICD-10-CM

## 2024-01-18 PROCEDURE — 67028 INJECTION EYE DRUG: CPT | Mod: RT | Performed by: OPHTHALMOLOGY

## 2024-01-18 PROCEDURE — 92134 CPTRZ OPH DX IMG PST SGM RTA: CPT | Performed by: OPHTHALMOLOGY

## 2024-01-18 ASSESSMENT — LID EXAM ASSESSMENTS
OD_BLEPHARITIS: 2+
OS_MEIBOMITIS: 1+
OS_BLEPHARITIS: 2+
OD_MEIBOMITIS: 1+

## 2024-01-18 ASSESSMENT — REFRACTION_AUTOREFRACTION
OS_SPHERE: +1.75
OD_AXIS: 88
OS_AXIS: 81
OD_CYLINDER: -1.75
OS_CYLINDER: -1.50
OD_SPHERE: +2.50

## 2024-01-18 ASSESSMENT — CONFRONTATIONAL VISUAL FIELD TEST (CVF)
OS_FINDINGS: FULL
OD_FINDINGS: FULL

## 2024-01-18 ASSESSMENT — SPHEQUIV_DERIVED
OS_SPHEQUIV: 1
OD_SPHEQUIV: 1.625

## 2024-02-22 ENCOUNTER — OFFICE (OUTPATIENT)
Dept: URBAN - METROPOLITAN AREA CLINIC 32 | Facility: CLINIC | Age: 79
Setting detail: OPHTHALMOLOGY
End: 2024-02-22
Payer: MEDICARE

## 2024-02-22 DIAGNOSIS — E11.3511: ICD-10-CM

## 2024-02-22 DIAGNOSIS — H35.3132: ICD-10-CM

## 2024-02-22 DIAGNOSIS — E11.3513: ICD-10-CM

## 2024-02-22 DIAGNOSIS — H43.11: ICD-10-CM

## 2024-02-22 PROCEDURE — 67228 TREATMENT X10SV RETINOPATHY: CPT | Mod: RT | Performed by: OPHTHALMOLOGY

## 2024-02-22 PROCEDURE — 92134 CPTRZ OPH DX IMG PST SGM RTA: CPT | Performed by: OPHTHALMOLOGY

## 2024-02-22 ASSESSMENT — LID EXAM ASSESSMENTS
OS_MEIBOMITIS: 1+
OD_BLEPHARITIS: 2+
OS_BLEPHARITIS: 2+
OD_MEIBOMITIS: 1+

## 2024-02-22 ASSESSMENT — REFRACTION_AUTOREFRACTION
OD_CYLINDER: -1.75
OD_AXIS: 88
OD_SPHERE: +2.50
OS_SPHERE: +1.75
OS_CYLINDER: -1.50
OS_AXIS: 81

## 2024-02-22 ASSESSMENT — SPHEQUIV_DERIVED
OD_SPHEQUIV: 1.625
OS_SPHEQUIV: 1

## 2024-02-22 ASSESSMENT — CONFRONTATIONAL VISUAL FIELD TEST (CVF)
OS_FINDINGS: FULL
OD_FINDINGS: FULL

## 2024-02-29 ENCOUNTER — OFFICE (OUTPATIENT)
Dept: URBAN - METROPOLITAN AREA CLINIC 32 | Facility: CLINIC | Age: 79
Setting detail: OPHTHALMOLOGY
End: 2024-02-29
Payer: MEDICARE

## 2024-02-29 DIAGNOSIS — E11.3511: ICD-10-CM

## 2024-02-29 DIAGNOSIS — H35.371: ICD-10-CM

## 2024-02-29 PROCEDURE — 92134 CPTRZ OPH DX IMG PST SGM RTA: CPT | Performed by: OPHTHALMOLOGY

## 2024-02-29 PROCEDURE — 67028 INJECTION EYE DRUG: CPT | Mod: 58,RT | Performed by: OPHTHALMOLOGY

## 2024-02-29 ASSESSMENT — LID EXAM ASSESSMENTS
OD_MEIBOMITIS: 1+
OS_BLEPHARITIS: 2+
OS_MEIBOMITIS: 1+
OD_BLEPHARITIS: 2+

## 2024-02-29 ASSESSMENT — CONFRONTATIONAL VISUAL FIELD TEST (CVF)
OS_FINDINGS: FULL
OD_FINDINGS: FULL

## 2024-03-11 NOTE — ED ADULT NURSE NOTE - THROAT
asymptomatic [facility]  Pain Medicine  [address], [address]  [place] [address]  Phone: [phone]  Fax: [phone]  Follow Up Time:

## 2024-05-30 ENCOUNTER — OFFICE (OUTPATIENT)
Dept: URBAN - METROPOLITAN AREA CLINIC 32 | Facility: CLINIC | Age: 79
Setting detail: OPHTHALMOLOGY
End: 2024-05-30
Payer: MEDICARE

## 2024-05-30 DIAGNOSIS — H35.3132: ICD-10-CM

## 2024-05-30 DIAGNOSIS — H35.371: ICD-10-CM

## 2024-05-30 DIAGNOSIS — E11.3513: ICD-10-CM

## 2024-05-30 DIAGNOSIS — H43.11: ICD-10-CM

## 2024-05-30 PROCEDURE — 92235 FLUORESCEIN ANGRPH MLTIFRAME: CPT | Performed by: OPHTHALMOLOGY

## 2024-05-30 PROCEDURE — 99213 OFFICE O/P EST LOW 20 MIN: CPT | Performed by: OPHTHALMOLOGY

## 2024-05-30 PROCEDURE — 92134 CPTRZ OPH DX IMG PST SGM RTA: CPT | Performed by: OPHTHALMOLOGY

## 2024-05-30 ASSESSMENT — LID EXAM ASSESSMENTS
OD_BLEPHARITIS: 2+
OS_MEIBOMITIS: 1+
OS_BLEPHARITIS: 2+
OD_MEIBOMITIS: 1+

## 2024-09-05 ENCOUNTER — DOCTOR'S OFFICE (OUTPATIENT)
Facility: LOCATION | Age: 79
Setting detail: OPHTHALMOLOGY
End: 2024-09-05
Payer: MEDICARE

## 2024-09-05 DIAGNOSIS — H35.3132: ICD-10-CM

## 2024-09-05 DIAGNOSIS — H35.371: ICD-10-CM

## 2024-09-05 DIAGNOSIS — H43.11: ICD-10-CM

## 2024-09-05 DIAGNOSIS — E11.3511: ICD-10-CM

## 2024-09-05 DIAGNOSIS — E11.3512: ICD-10-CM

## 2024-09-05 DIAGNOSIS — H43.391: ICD-10-CM

## 2024-09-05 PROCEDURE — 92235 FLUORESCEIN ANGRPH MLTIFRAME: CPT | Performed by: OPHTHALMOLOGY

## 2024-09-05 PROCEDURE — 99214 OFFICE O/P EST MOD 30 MIN: CPT | Performed by: OPHTHALMOLOGY

## 2024-09-05 PROCEDURE — 92201 OPSCPY EXTND RTA DRAW UNI/BI: CPT | Performed by: OPHTHALMOLOGY

## 2024-09-05 PROCEDURE — 92134 CPTRZ OPH DX IMG PST SGM RTA: CPT | Performed by: OPHTHALMOLOGY

## 2024-09-05 ASSESSMENT — LID EXAM ASSESSMENTS
OD_MEIBOMITIS: 1+
OS_MEIBOMITIS: 1+

## 2024-11-07 NOTE — ED ADULT NURSE NOTE - CADM POA CENTRAL LINE
Detail Level: Zone Render In Strict Bullet Format?: No Initiate Treatment: hydrocortisone 2.5 % topical cream BID\\nQuantity: 30.0 g\\nSig: Apply 2x daily X1 week then prn to affected areas of rash as discussed\\n\\nketoconazole 2 % topical cream \\nQuantity: 60.0 g  Days Supply: 30\\nSig: Apply TP BID to rash for 1 week then continue for another 3 weeks\\n\\nOTC Zeasorb AF No

## 2024-12-30 ENCOUNTER — DOCTOR'S OFFICE (OUTPATIENT)
Facility: LOCATION | Age: 79
Setting detail: OPHTHALMOLOGY
End: 2024-12-30
Payer: MEDICARE

## 2024-12-30 DIAGNOSIS — H43.391: ICD-10-CM

## 2024-12-30 DIAGNOSIS — E11.3592: ICD-10-CM

## 2024-12-30 DIAGNOSIS — H35.3132: ICD-10-CM

## 2024-12-30 DIAGNOSIS — H35.371: ICD-10-CM

## 2024-12-30 DIAGNOSIS — E11.3591: ICD-10-CM

## 2024-12-30 PROCEDURE — 92235 FLUORESCEIN ANGRPH MLTIFRAME: CPT | Performed by: OPHTHALMOLOGY

## 2024-12-30 PROCEDURE — 92134 CPTRZ OPH DX IMG PST SGM RTA: CPT | Performed by: OPHTHALMOLOGY

## 2024-12-30 PROCEDURE — 99214 OFFICE O/P EST MOD 30 MIN: CPT | Performed by: OPHTHALMOLOGY

## 2024-12-30 ASSESSMENT — REFRACTION_AUTOREFRACTION
OD_SPHERE: +2.50
OD_AXIS: 88
OS_CYLINDER: -1.50
OS_SPHERE: +1.75
OD_CYLINDER: -1.75
OS_AXIS: 81

## 2024-12-30 ASSESSMENT — KERATOMETRY
OD_AXISANGLE_DEGREES: 158
OD_K2POWER_DIOPTERS: 44.00
OS_AXISANGLE_DEGREES: 3
OS_K1POWER_DIOPTERS: 42.87
OD_K1POWER_DIOPTERS: 43.62
OS_K2POWER_DIOPTERS: 44.25

## 2024-12-30 ASSESSMENT — LID EXAM ASSESSMENTS
OS_MEIBOMITIS: 1+
OD_MEIBOMITIS: 1+

## 2024-12-30 ASSESSMENT — VISUAL ACUITY
OS_BCVA: 20/50+2
OD_BCVA: 20/30-2

## 2024-12-30 ASSESSMENT — PACHYMETRY
OD_CT_UM: 565
OS_CT_UM: 603
OD_CT_CORRECTION: -1
OS_CT_CORRECTION: -4

## 2024-12-30 ASSESSMENT — TONOMETRY: OS_IOP_MMHG: 21

## 2025-03-06 ENCOUNTER — EMERGENCY (EMERGENCY)
Facility: HOSPITAL | Age: 80
LOS: 1 days | Discharge: HOME | End: 2025-03-06
Attending: EMERGENCY MEDICINE | Admitting: EMERGENCY MEDICINE
Payer: COMMERCIAL

## 2025-03-06 VITALS
HEART RATE: 85 BPM | OXYGEN SATURATION: 97 % | SYSTOLIC BLOOD PRESSURE: 158 MMHG | DIASTOLIC BLOOD PRESSURE: 75 MMHG | TEMPERATURE: 98 F | RESPIRATION RATE: 18 BRPM

## 2025-03-06 VITALS
WEIGHT: 141.32 LBS | RESPIRATION RATE: 19 BRPM | TEMPERATURE: 98 F | SYSTOLIC BLOOD PRESSURE: 198 MMHG | DIASTOLIC BLOOD PRESSURE: 79 MMHG | HEART RATE: 77 BPM | OXYGEN SATURATION: 100 % | HEIGHT: 63 IN

## 2025-03-06 LAB
ALBUMIN SERPL ELPH-MCNC: 3.5 G/DL — SIGNIFICANT CHANGE UP (ref 3.3–5)
ALP SERPL-CCNC: 95 U/L — SIGNIFICANT CHANGE UP (ref 30–120)
ALT FLD-CCNC: 51 U/L — SIGNIFICANT CHANGE UP (ref 10–60)
ANION GAP SERPL CALC-SCNC: 10 MMOL/L — SIGNIFICANT CHANGE UP (ref 5–17)
ANION GAP SERPL CALC-SCNC: 11 MMOL/L — SIGNIFICANT CHANGE UP (ref 5–17)
APPEARANCE UR: CLEAR — SIGNIFICANT CHANGE UP
APTT BLD: 33.4 SEC — SIGNIFICANT CHANGE UP (ref 24.5–35.6)
AST SERPL-CCNC: 29 U/L — SIGNIFICANT CHANGE UP (ref 10–40)
BASOPHILS # BLD AUTO: 0.02 K/UL — SIGNIFICANT CHANGE UP (ref 0–0.2)
BASOPHILS NFR BLD AUTO: 0.5 % — SIGNIFICANT CHANGE UP (ref 0–2)
BILIRUB SERPL-MCNC: 0.6 MG/DL — SIGNIFICANT CHANGE UP (ref 0.2–1.2)
BILIRUB UR-MCNC: NEGATIVE — SIGNIFICANT CHANGE UP
BUN SERPL-MCNC: 30 MG/DL — HIGH (ref 7–23)
BUN SERPL-MCNC: 34 MG/DL — HIGH (ref 7–23)
CALCIUM SERPL-MCNC: 8.4 MG/DL — SIGNIFICANT CHANGE UP (ref 8.4–10.5)
CALCIUM SERPL-MCNC: 8.7 MG/DL — SIGNIFICANT CHANGE UP (ref 8.4–10.5)
CHLORIDE SERPL-SCNC: 100 MMOL/L — SIGNIFICANT CHANGE UP (ref 96–108)
CHLORIDE SERPL-SCNC: 95 MMOL/L — LOW (ref 96–108)
CO2 SERPL-SCNC: 20 MMOL/L — LOW (ref 22–31)
CO2 SERPL-SCNC: 23 MMOL/L — SIGNIFICANT CHANGE UP (ref 22–31)
COLOR SPEC: YELLOW — SIGNIFICANT CHANGE UP
CREAT SERPL-MCNC: 1.27 MG/DL — SIGNIFICANT CHANGE UP (ref 0.5–1.3)
CREAT SERPL-MCNC: 1.39 MG/DL — HIGH (ref 0.5–1.3)
DIFF PNL FLD: NEGATIVE — SIGNIFICANT CHANGE UP
EGFR: 39 ML/MIN/1.73M2 — LOW
EGFR: 43 ML/MIN/1.73M2 — LOW
EOSINOPHIL # BLD AUTO: 0.19 K/UL — SIGNIFICANT CHANGE UP (ref 0–0.5)
EOSINOPHIL NFR BLD AUTO: 4.5 % — SIGNIFICANT CHANGE UP (ref 0–6)
GLUCOSE SERPL-MCNC: 108 MG/DL — HIGH (ref 70–99)
GLUCOSE SERPL-MCNC: 131 MG/DL — HIGH (ref 70–99)
GLUCOSE UR QL: NEGATIVE MG/DL — SIGNIFICANT CHANGE UP
HCT VFR BLD CALC: 27.3 % — LOW (ref 34.5–45)
HGB BLD-MCNC: 9.1 G/DL — LOW (ref 11.5–15.5)
IMM GRANULOCYTES NFR BLD AUTO: 0.2 % — SIGNIFICANT CHANGE UP (ref 0–0.9)
INR BLD: 0.9 RATIO — SIGNIFICANT CHANGE UP (ref 0.85–1.16)
KETONES UR-MCNC: NEGATIVE MG/DL — SIGNIFICANT CHANGE UP
LEUKOCYTE ESTERASE UR-ACNC: NEGATIVE — SIGNIFICANT CHANGE UP
LYMPHOCYTES # BLD AUTO: 1.1 K/UL — SIGNIFICANT CHANGE UP (ref 1–3.3)
LYMPHOCYTES # BLD AUTO: 25.8 % — SIGNIFICANT CHANGE UP (ref 13–44)
MCHC RBC-ENTMCNC: 30.5 PG — SIGNIFICANT CHANGE UP (ref 27–34)
MCHC RBC-ENTMCNC: 33.3 G/DL — SIGNIFICANT CHANGE UP (ref 32–36)
MCV RBC AUTO: 91.6 FL — SIGNIFICANT CHANGE UP (ref 80–100)
MONOCYTES # BLD AUTO: 0.4 K/UL — SIGNIFICANT CHANGE UP (ref 0–0.9)
MONOCYTES NFR BLD AUTO: 9.4 % — SIGNIFICANT CHANGE UP (ref 2–14)
NEUTROPHILS # BLD AUTO: 2.54 K/UL — SIGNIFICANT CHANGE UP (ref 1.8–7.4)
NEUTROPHILS NFR BLD AUTO: 59.6 % — SIGNIFICANT CHANGE UP (ref 43–77)
NITRITE UR-MCNC: NEGATIVE — SIGNIFICANT CHANGE UP
NRBC BLD AUTO-RTO: 0 /100 WBCS — SIGNIFICANT CHANGE UP (ref 0–0)
PH UR: 7.5 — SIGNIFICANT CHANGE UP (ref 5–8)
PLATELET # BLD AUTO: 167 K/UL — SIGNIFICANT CHANGE UP (ref 150–400)
POTASSIUM SERPL-MCNC: 4 MMOL/L — SIGNIFICANT CHANGE UP (ref 3.5–5.3)
POTASSIUM SERPL-MCNC: 4.5 MMOL/L — SIGNIFICANT CHANGE UP (ref 3.5–5.3)
POTASSIUM SERPL-SCNC: 4 MMOL/L — SIGNIFICANT CHANGE UP (ref 3.5–5.3)
POTASSIUM SERPL-SCNC: 4.5 MMOL/L — SIGNIFICANT CHANGE UP (ref 3.5–5.3)
PROT SERPL-MCNC: 7.2 G/DL — SIGNIFICANT CHANGE UP (ref 6–8.3)
PROT UR-MCNC: 30 MG/DL
PROTHROM AB SERPL-ACNC: 10.5 SEC — SIGNIFICANT CHANGE UP (ref 9.9–13.4)
RBC # BLD: 2.98 M/UL — LOW (ref 3.8–5.2)
RBC # FLD: 13.2 % — SIGNIFICANT CHANGE UP (ref 10.3–14.5)
SODIUM SERPL-SCNC: 128 MMOL/L — LOW (ref 135–145)
SODIUM SERPL-SCNC: 131 MMOL/L — LOW (ref 135–145)
SP GR SPEC: 1 — SIGNIFICANT CHANGE UP (ref 1–1.03)
TROPONIN I, HIGH SENSITIVITY RESULT: 20.3 NG/L — SIGNIFICANT CHANGE UP
UROBILINOGEN FLD QL: 0.2 MG/DL — SIGNIFICANT CHANGE UP (ref 0.2–1)
WBC # BLD: 4.26 K/UL — SIGNIFICANT CHANGE UP (ref 3.8–10.5)
WBC # FLD AUTO: 4.26 K/UL — SIGNIFICANT CHANGE UP (ref 3.8–10.5)

## 2025-03-06 PROCEDURE — 85610 PROTHROMBIN TIME: CPT

## 2025-03-06 PROCEDURE — 70450 CT HEAD/BRAIN W/O DYE: CPT | Mod: MC

## 2025-03-06 PROCEDURE — 70498 CT ANGIOGRAPHY NECK: CPT | Mod: 26

## 2025-03-06 PROCEDURE — 99285 EMERGENCY DEPT VISIT HI MDM: CPT | Mod: 25

## 2025-03-06 PROCEDURE — 82962 GLUCOSE BLOOD TEST: CPT

## 2025-03-06 PROCEDURE — 71045 X-RAY EXAM CHEST 1 VIEW: CPT

## 2025-03-06 PROCEDURE — 70496 CT ANGIOGRAPHY HEAD: CPT | Mod: 26

## 2025-03-06 PROCEDURE — 93005 ELECTROCARDIOGRAM TRACING: CPT

## 2025-03-06 PROCEDURE — 71045 X-RAY EXAM CHEST 1 VIEW: CPT | Mod: 26

## 2025-03-06 PROCEDURE — 70450 CT HEAD/BRAIN W/O DYE: CPT | Mod: 26,XU

## 2025-03-06 PROCEDURE — 84484 ASSAY OF TROPONIN QUANT: CPT

## 2025-03-06 PROCEDURE — 85730 THROMBOPLASTIN TIME PARTIAL: CPT

## 2025-03-06 PROCEDURE — 70496 CT ANGIOGRAPHY HEAD: CPT | Mod: MC

## 2025-03-06 PROCEDURE — 36415 COLL VENOUS BLD VENIPUNCTURE: CPT

## 2025-03-06 PROCEDURE — 81001 URINALYSIS AUTO W/SCOPE: CPT

## 2025-03-06 PROCEDURE — 80048 BASIC METABOLIC PNL TOTAL CA: CPT

## 2025-03-06 PROCEDURE — 70498 CT ANGIOGRAPHY NECK: CPT | Mod: MC

## 2025-03-06 PROCEDURE — 85025 COMPLETE CBC W/AUTO DIFF WBC: CPT

## 2025-03-06 PROCEDURE — 93010 ELECTROCARDIOGRAM REPORT: CPT

## 2025-03-06 PROCEDURE — 99285 EMERGENCY DEPT VISIT HI MDM: CPT

## 2025-03-06 PROCEDURE — 80053 COMPREHEN METABOLIC PANEL: CPT

## 2025-03-06 PROCEDURE — 96360 HYDRATION IV INFUSION INIT: CPT | Mod: XU

## 2025-03-06 RX ORDER — ASPIRIN 325 MG
324 TABLET ORAL ONCE
Refills: 0 | Status: COMPLETED | OUTPATIENT
Start: 2025-03-06 | End: 2025-03-06

## 2025-03-06 RX ADMIN — Medication 250 MILLILITER(S): at 20:53

## 2025-03-06 RX ADMIN — Medication 1000 MILLILITER(S): at 20:52

## 2025-03-06 RX ADMIN — Medication 1000 MILLILITER(S): at 21:52

## 2025-03-06 RX ADMIN — Medication 324 MILLIGRAM(S): at 20:51

## 2025-03-06 RX ADMIN — Medication 250 MILLILITER(S): at 21:53

## 2025-03-06 NOTE — ED PROVIDER NOTE - CLINICAL SUMMARY MEDICAL DECISION MAKING FREE TEXT BOX
79y F c/o dizziness and arm numbness, just had 2 BP meds doubled today which may have caused similar symptoms in the past, will check basic and cardiac labs/ekg, ct head and hydrate

## 2025-03-06 NOTE — ED ADULT NURSE NOTE - NSFALLRISKINTERV_ED_ALL_ED

## 2025-03-06 NOTE — ED PROVIDER NOTE - CARE PROVIDER_API CALL
Pancho Hardy Esme  [facility]  [address], [address]  [place] [address]  Phone: [phone]  Fax: [phone]  Follow Up Time: 1-3 Days

## 2025-03-06 NOTE — ED PROVIDER NOTE - NSFOLLOWUPINSTRUCTIONS_ED_ALL_ED_FT
Call your primary doctor tomorrow to discuss tonight's emergency room visit.    At this time resume the half pill doses you were on before today of the 2 blood pressure pills. You will need to discuss this with your primary care provider.    Your salt level was low and you were dehydrated. Be sure to eat and drink well.    Return to the emergency room for new/worsening symptoms.      -------------------------------------------------------        Dehydration, Adult  Dehydration is a condition in which there is not enough water or other fluids in the body. This happens when a person loses more fluids than they take in. Important organs, such as the kidneys, brain, and heart, cannot function without a proper amount of fluids. Any loss of fluids from the body can lead to dehydration.    Dehydration can be mild, moderate, or severe. It should be treated right away to prevent it from becoming severe.    What are the causes?  Dehydration may be caused by:  Health conditions, such as diarrhea, vomiting, fever, infection, or sweating or urinating a lot.  Not drinking enough fluids.  Certain medicines, such as medicines that remove excess fluid from the body (diuretics).  Lack of safe drinking water.  Not being able to get enough water and food.  What increases the risk?  The following factors may make you more likely to develop this condition:  Having a long-term (chronic) illness that has not been treated properly, such as diabetes, heart disease, or kidney disease.  Being 65 years of age or older.  Having a disability.  Living in a place that is high in altitude, where thinner, drier air causes more fluid loss.  Doing exercises that put stress on your body for a long time (endurance sports).  Being active in a hot climate.  What are the signs or symptoms?  Symptoms of dehydration depend on how severe it is.    Mild or moderate dehydration    Thirst.  Dry lips or dry mouth.  Dizziness or light-headedness.  Muscle cramps.  Dark urine. Urine may be the color of tea.  Less urine or tears produced than usual.  Headache.  Severe dehydration    Changes in skin. Your skin may be cold and clammy, blotchy, or pale. Your skin also may not return to normal after being lightly pinched and released.  Little or no tears, urine, or sweat.  Rapid breathing and low blood pressure. Your pulse may be weak or may be faster than 100 beats per minute when you are sitting still.  Other changes, such as:  Feeling very thirsty.  Sunken eyes.  Cold hands and feet.  Confusion.  Being very tired (lethargic) or having trouble waking from sleep.  Short-term weight loss.  Loss of consciousness.  How is this diagnosed?  This condition is diagnosed based on your symptoms and a physical exam. You may have blood and urine tests to help confirm the diagnosis.    How is this treated?  A person's hand, showing an inserted IV catheter.   Treatment for this condition depends on how severe it is. Treatment should be started right away. Do not wait until dehydration becomes severe. Severe dehydration is an emergency and needs to be treated in a hospital.  Mild or moderate dehydration can be treated at home. You may be asked to:  Drink more fluids.  Drink an oral rehydration solution (ORS). This drink restores fluids, salts, and minerals in the blood (electrolytes).  Stop any activities that caused dehydration, such as exercise.  Cool off with cool compresses, cool mist, or cool fluids, if heat or too much sweat caused your condition.  Take medicine to treat fever, if fever caused your condition.  Take medicine to treat nausea and diarrhea, if vomiting or diarrhea caused your condition.  Severe dehydration can be treated:  With IV fluids.  By correcting abnormal levels of electrolytes in your body.  By treating the underlying cause of dehydration.  Follow these instructions at home:  Oral rehydration solution    A glass of water with a spoonful of ORS ready to be added to it.  If told by your health care provider, drink an ORS:  Make an ORS by following instructions on the package.  Start by drinking small amounts, about ½ cup (120 mL) every 5–10 minutes.  Slowly increase how much you drink until you have taken the amount recommended by your health care provider.  Eating and drinking    A person drinking water from a glass.   Drink enough clear fluid to keep your urine pale yellow. If you were told to drink an ORS, finish the ORS first and then start slowly drinking other clear fluids. Drink fluids such as:  Water. Do not drink only water. Doing that can lead to hyponatremia, which is having too little salt (sodium) in the body.  Water from ice chips you suck on.  Diluted fruit juice. This is fruit juice that you have added water to.  Low-calorie sports drinks.  Eat foods that contain a healthy balance of electrolytes, such as bananas, oranges, potatoes, tomatoes, and spinach.  Do not drink alcohol.  Avoid the following:  Drinks that contain a lot of sugar. These include high-calorie sports drinks, fruit juice that is not diluted, and soda.  Caffeine.  Foods that are greasy or contain a lot of fat or sugar.  General instructions    Take over-the-counter and prescription medicines only as told by your health care provider.  Do not take sodium tablets. Doing that can lead to having too much sodium in the body (hypernatremia).  Return to your normal activities as told by your health care provider. Ask your health care provider what activities are safe for you.  Keep all follow-up visits. Your health care provider may need to check your progress and suggest new ways to treat your condition.  Contact a health care provider if:  You have muscle cramps, pain, or discomfort, such as:  Pain in your abdomen and the pain gets worse or stays in one area.  Stiff neck.  You have a rash.  You are more irritable than usual.  You are sleepier or have a harder time waking.  You feel weak or dizzy.  You feel very thirsty.  Get help right away if:  You have symptoms of severe dehydration.  You vomit every time you eat or drink.  Your vomiting gets worse, does not go away, or includes blood or green matter (bile).  You are getting treatment but symptoms are getting worse.  You have a fever.  You have a severe headache.  You have:  Diarrhea that gets worse or does not go away.  Blood in your stool. This may cause stool to look black and tarry.  Not urinating, or urinating only a small amount of very dark urine, within 6–8 hours.  You have trouble breathing.  These symptoms may be an emergency. Get help right away.  Do not wait to see if the symptoms will go away.  Do not drive yourself to the hospital. Call 911.  This information is not intended to replace advice given to you by your health care provider. Make sure you discuss any questions you have with your health care provider.

## 2025-03-06 NOTE — ED PROVIDER NOTE - WR ORDER DATE AND TIME 1
[facility] Consult Note - [place]   Staff: MD Milena  Group:Milena/Richard/Junito/William  Referring provider and please cc:    PCP: Rajni Hickey MD      Subjective:      HPI: Juan Jose Wiseman is a 19 y.o. male     Right uvj stone s/p ureteroscopy with hypercalciuria and hyperparahtyroidism  -He went to ER on 5/18/18 for right flank pain 10/10 that had been present for 3-4 days. +nausea and vomiting. No fever. +frequency and urgency. No dysuria and decreased UOP (likely from dehydration). CTAP with contrast showed a 7mm right distal ureteral stone with proximal hydronephrosis. No other stones seen on ct scan.  WBC was 12.7, cr was normal. ALT 49, Calcium 11.3. Ua showed blood and protein, hyaline casts. He was sent home with norco, zofran and flomax.  failed trial of passge  -6/13/18 underwent right urs and stone extraction with stent on strings. Removed stent a few days later without any problems. This was his only and firs stone. Stones were 60% ca phosphate/40%ca phosphate apatite   -I had him see  in [place] for persistent elevated pth (270s then 220ss) and elevated calcium 11.9. He underwent a parathyroidectomy in 12/2018. F/u pth was normal.   -last seen by me on 7/27/19 and this was his first and only stone. Plan was for f/u prn. He called 9/5 with c/o GH and difficulty urinating. I recommended a kub,rbus and f/u. They did not make f/u due to work and school. Came to er today with c/o nausea/vomiting/hematuria/pain and chills. ctrss in er showed a RUP 6mm stone, R UVJ 8mm stone and bilateral smaller obstructing stones. Wbc 12.7. Cr normal. No fevers.ua orange with 4 wbc/25 rbc and rare bact.        ua today: orange, 25 rbc/4 wbc   ua history/ucx:  6/12/18            Ng, void: tr blood  6/6/18              Ng, void: tr bld/trketones  5/25/18            Ng, void: tr leuk and blood, cath: 2+blood  5/18/18            No cx, void: large bloodNo family hx of stones.     REVIEW  OF SYSTEMS:  General ROS: no fevers, no chills  Psychological ROS: no depression  Endocrine ROS: no heat or cold  Respiratory ROS: no SOB  Cardiovascular ROS: no CP  Gastrointestinal ROS: no abdominal pain, no constipation, no diarrhea, noBRBPR  Musculoskeletal ROS: no muscle pain  Neurological ROS: no headaches  Dermatological ROS: no rashes  HEENT: nonglasses, no sinus   ROS: per HPI     PMHx:  Past Medical History:   Diagnosis Date    Hyperparathyroidism        PSHx:  Past Surgical History:   Procedure Laterality Date    CYSTOSCOPY, WITH RETROGRADE PYELOGRAM AND URETERAL STENT INSERTION Right 6/13/2018    [facility]    head stiches  2009    2006 left pinky stiches    LITHOTRIPSY, USING LASER Right 6/13/2018    [facility]    PARATHYROIDECTOMY N/A 12/7/2018    [facility] at [facility]    REMOVAL, CALCULUS, URETER, URETEROSCOPIC Right 6/13/2018    [facility] at [facility]    TONSILLECTOMY      TYMPANOSTOMY TUBE PLACEMENT         Stents/Valves/Foreign Bodies: No  Cardiac Evaluation: No     Screening Studies  Colonoscopy: no     Fam Hx:   malignancies: No  . Gyn malignancies: no.   kidney stones: No      Soc Hx:  + tobacco.  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 No wheezing.   Cardiovascular: No obvious extremity edema. Warm and well perfused.   GI: no palpation of masses. No tenderness. No hepatosplenomegaly to  palpation.  Musculoskeletal: normal range of motion of bilateral upper extremities. Normal muscle strength and tone.  Skin: no obvious rashes or lesions. No tightening of skin noted.  Neurologic: CN grossly normal. Normal sensation.   Psychiatric: awake, alert and oriented x 3. Mood and affect normal. Cooperative.     exam   deferred    LABS REVIEW:  Recent labs:   Recent Labs   Lab 06/12/19  1155 09/11/19  1353   WBC 6.86 12.75 H   Hemoglobin 14.1 14.3   Hematocrit 42.6 43.3   Platelets 250 204   ]  Recent Labs   Lab 07/16/18  0852 10/11/18  1428 12/12/18  1036 06/12/19  1155 09/11/19  1353   Sodium 138 140 138 139 138   Potassium 4.5 4.1 4.3 4.1 3.9   Chloride 106 104 104 105 104   CO2 26 27 25 22 L 25   BUN, Bld 9 8 11 14 9   Creatinine 0.8 0.7 0.8 0.8 0.8   Glucose 96 115 H 89 81 98   Calcium 11.9 H 12.2 HH 10.1 10.1 9.6   Phosphorus 2.7 2.0 L 2.6 L  --   --    Alkaline Phosphatase 124  --   --  67 59   Total Protein 7.4  --   --  7.6 7.7   Albumin 4.5 4.3 4.4 4.5 4.8   Total Bilirubin 0.8  --   --  0.4 0.6   AST 16  --   --  17 17   ALT 26  --   --  21 26   ]    No results found for: LABA1C, HGBA1C        Pertinent urologic PATHOLOGY REVIEW:  Stones were 60% ca phosphate/40%ca phosphate apatite     Pertinent Urologic RADIOGRAPHIC REVIEW:  ctrss 9/11/19  Lung bases clear.  Normal size heart.  Nondistended gallbladder.  There are several stones in the right renal collecting system, at least 3 in number with largest measuring 7 mm.  1 mm stone in the left renal collecting system.  There is hydroureteronephrosis on the right with a 7 mm stone near the UVJ.  Remaining solid abdominal organs grossly unremarkable on this noncontrast exam.  There is no enteric contrast which limits bowel assessment.  No dilated loops of bowel.  Normal appendix.  Unremarkable noncontrast prostate.  Several prominent but nonenlarged mesenteric and retroperitoneal lymph nodes.  No acute osseous abnormality.    rbus 6/11/18  No hydro and  possible right renal stone     kub 6/11/18  Nonspecific 5 mm calcification right hemipelvis, for which a distal right ureteral stone cannot be excluded     ctap w 5/18/18 [facility]  7mm distal stone with proximal hydro   No other stones      Assessment:     1. Nephrolithiasis      R uvj stone    Plan:     With obstructing 8 mm stone with proximal hydroureteronephrosis, slightly elevated white count, chills will plan to either place stent or perform stone extraction as long as it is easy to get to and there is no proximal purulent urine.  If purulent urine seen Will placed stent and place [place] and patient will need to be admitted for IV antibiotics until culture returns.    If urine is clear will decide to keep him based on his postop vitals.  Chills may have been from pain.    He still has stones in his kidney including a 7-8 mm stone upper pole that could pass.  Would recommend shockwave therapy for this we do not get to this today or at a later point if he has a stent placed.  He still has other stones that need to be monitored beyond this stone with a renal bladder ultrasound and KUB, would also repeat a 24 hr urine and a parathyroid hormone    The patient is scheduled for ureteroscopy. The risks and benefits of ureteroscopy were discussed with the patient in detail.  Consent was obtained.  The risks include but are not limited to burning with urination, bleeding, infection, pain, incomplete fragmentation of the stone, need for further procedures, injury to the kidney, ureter, bladder and need for open surgery.  The patient was informed that they may require a ureteral stent and that stents can cause irritative voiding symptoms.  They also understand that ureteral stents are temporary and must be removed or exchanged in a timely fashion as they can calcify and make more stones and become difficult to remove. Alternative treatments were also discussed with the patient in detail to include ESWL, percutaneous treatment  of the stone, open surgery or observation. Patient understands these risks and has agreed to proceed with surgery.      Kayla Abbott MD      06-Mar-2025 20:03

## 2025-03-06 NOTE — ED PROVIDER NOTE - NSICDXFAMILYHX_GEN_ALL_CORE_FT
FAMILY HISTORY:  Sibling  Still living? Yes, Estimated age: 61-70  Family history of coronary artery disease, Age at diagnosis: Age Unknown  Family history of coronary artery disease, Age at diagnosis: Age Unknown

## 2025-03-06 NOTE — ED PROVIDER NOTE - CPE EDP RESP NORM
[FreeTextEntry1] : follow up  [de-identified] : 72 yo m presents to follow up  abnormal kidney function, hx of diabetes, anemia, hx of abnormal labs   here for repeat  here for bp check as well-- 130s/90s at home  normal...

## 2025-03-06 NOTE — ED PROVIDER NOTE - PATIENT PORTAL LINK FT
You can access the FollowMyHealth Patient Portal offered by [facility] by registering at the following website: [email or web address]. By joining [employer]’s FollowMyHealth portal, you will also be able to view your health information using other applications (apps) compatible with our system.

## 2025-03-06 NOTE — ED ADULT NURSE NOTE - OBJECTIVE STATEMENT
ID  #[id number]. Pt is alert and oriented. Pt states that at 14:00 she developed dizziness. Pt states that yesterday she was advised by the PCP to take her full tablets of lisinopril and coreg, which she was been taking half the dose because the full dose made her dizzy. Pt states that she feels nauseous and pressure in her epigastric area. Pts daughter states that her gait seems off and the patient is needing assistance with her balance. Pt denies sob, chest pain, and vomiting. Pt resp are even and unlabored, skin color artur for race. Pt updated on plan of care. ID  #[id number]. Pt is alert and oriented. Pt states that at 14:00 she developed dizziness. Pt states that yesterday she was advised by the PCP to take her full tablets of lisinopril and coreg, which she was been taking half the dose because the full dose made her dizzy. Pt states that she feels nauseous and pressure in her epigastric area. Pts daughter states that her gait seems off and the patient is needing assistance with her balance. Pts daughter states that the patient left lateral arm started to feel numb while she was waiting to be seen. Pt denies sob, chest pain, and vomiting. Pt resp are even and unlabored, skin color artur for race. Pt updated on plan of care.

## 2025-03-06 NOTE — ED PROVIDER NOTE - GASTROINTESTINAL, MLM
Tom is growing and developing well.  You should continue to place your child rear facing in a car seat until age 2.  You should switch from bottles to sippy cups, and complete the progression from baby foods to finger foods.     Continue reading to your child daily to promote language and literacy development, even at this young age.     Tom should return for a 15 month well visit.  By 15 months, your child may be able to walk well, say a few words, climb up stairs or on to high furniture, and follows simple directions and understand more language.    We gave MMR, varicella (chicken pox) and Hepatitis A vaccine today.    For the vaccines, Vaccine Information Sheets were offered and counseling on vaccine side effects was given.  Side effects most commonly include fever, redness at the injection site, or swelling at the site.  Younger children may be fussy and older children may complain of pain. You can use acetaminophen at any age or ibuprofen for age 6 months and up.  Much more rarely, call back or go to the ER if your child has inconsolable crying, wheezing, difficulty breathing, or other concerns.      Hemoglobin to test for Anemia: 12.2  Fluoride: applied  Lead: done        Abdomen soft, non-tender, ND, no guarding.

## 2025-03-06 NOTE — ED PROVIDER NOTE - NSICDXPASTMEDICALHX_GEN_ALL_CORE_FT
PAST MEDICAL HISTORY:  CAD (coronary artery disease)     Diabetes     Dyslipidemia     Hypertension

## 2025-03-06 NOTE — ED PROVIDER NOTE - OBJECTIVE STATEMENT
Mandarin  [id number]    79y F BIB family for dizziness - pt reports she woke up feeling well, she had been to see her PCP yesterday who advised her to take full tablets of her lisinopril and coreg (has been taking half, the full tablets used to make her dizzy), so today took full tablets, it was shortly after that Mandarin  [id number]    79y F BIB family for dizziness - pt reports she woke up feeling well, she had been to see her PCP yesterday who advised her to take full tablets of her lisinopril and coreg (has been taking half, the full tablets used to make her dizzy), so today took full tablets, it was shortly after that that she started to feel lightheaded, not spinning, felt unsteady, did not feel she would fall or pass out, normally feels slow but walks unassisted, today did lean on her daughter when walking, has also had some nausea, no vomiting, some chest discomfort and feeling of not taking full breaths, this is similar to when she used to take the full pills before, but worse, pt's daughter also states the last time she came to the hospital for this she was transferred to [facility] and had 2 stents, also pt states that while waiting to be seen started feeling numbness left arm, only lateral forearm, not hand, not upper arm

## 2025-03-15 ENCOUNTER — INPATIENT (INPATIENT)
Facility: HOSPITAL | Age: 80
LOS: 2 days | Discharge: HOME | DRG: 641 | End: 2025-03-18
Attending: INTERNAL MEDICINE | Admitting: INTERNAL MEDICINE
Payer: COMMERCIAL

## 2025-03-15 VITALS
DIASTOLIC BLOOD PRESSURE: 78 MMHG | HEIGHT: 63 IN | SYSTOLIC BLOOD PRESSURE: 200 MMHG | HEART RATE: 82 BPM | TEMPERATURE: 98 F | OXYGEN SATURATION: 100 % | WEIGHT: 149.91 LBS | RESPIRATION RATE: 14 BRPM

## 2025-03-15 DIAGNOSIS — E87.1 HYPO-OSMOLALITY AND HYPONATREMIA: ICD-10-CM

## 2025-03-15 LAB
ALBUMIN SERPL ELPH-MCNC: 3.4 G/DL — SIGNIFICANT CHANGE UP (ref 3.3–5)
ALP SERPL-CCNC: 83 U/L — SIGNIFICANT CHANGE UP (ref 30–120)
ALT FLD-CCNC: 34 U/L — SIGNIFICANT CHANGE UP (ref 10–60)
ANION GAP SERPL CALC-SCNC: 10 MMOL/L — SIGNIFICANT CHANGE UP (ref 5–17)
APTT BLD: 32.3 SEC — SIGNIFICANT CHANGE UP (ref 24.5–35.6)
AST SERPL-CCNC: 19 U/L — SIGNIFICANT CHANGE UP (ref 10–40)
BASOPHILS # BLD AUTO: 0.01 K/UL — SIGNIFICANT CHANGE UP (ref 0–0.2)
BASOPHILS NFR BLD AUTO: 0.2 % — SIGNIFICANT CHANGE UP (ref 0–2)
BILIRUB SERPL-MCNC: 0.5 MG/DL — SIGNIFICANT CHANGE UP (ref 0.2–1.2)
BUN SERPL-MCNC: 33 MG/DL — HIGH (ref 7–23)
CALCIUM SERPL-MCNC: 8.9 MG/DL — SIGNIFICANT CHANGE UP (ref 8.4–10.5)
CHLORIDE SERPL-SCNC: 88 MMOL/L — LOW (ref 96–108)
CK SERPL-CCNC: 95 U/L — SIGNIFICANT CHANGE UP (ref 26–192)
CO2 SERPL-SCNC: 24 MMOL/L — SIGNIFICANT CHANGE UP (ref 22–31)
CREAT SERPL-MCNC: 1.37 MG/DL — HIGH (ref 0.5–1.3)
EGFR: 39 ML/MIN/1.73M2 — LOW
EGFR: 39 ML/MIN/1.73M2 — LOW
EOSINOPHIL # BLD AUTO: 0.11 K/UL — SIGNIFICANT CHANGE UP (ref 0–0.5)
EOSINOPHIL NFR BLD AUTO: 2.6 % — SIGNIFICANT CHANGE UP (ref 0–6)
GLUCOSE SERPL-MCNC: 106 MG/DL — HIGH (ref 70–99)
HCT VFR BLD CALC: 24.7 % — LOW (ref 34.5–45)
HGB BLD-MCNC: 8.7 G/DL — LOW (ref 11.5–15.5)
IMM GRANULOCYTES NFR BLD AUTO: 0.2 % — SIGNIFICANT CHANGE UP (ref 0–0.9)
INR BLD: 0.88 RATIO — SIGNIFICANT CHANGE UP (ref 0.85–1.16)
LYMPHOCYTES # BLD AUTO: 0.9 K/UL — LOW (ref 1–3.3)
LYMPHOCYTES # BLD AUTO: 20.9 % — SIGNIFICANT CHANGE UP (ref 13–44)
MCHC RBC-ENTMCNC: 30.7 PG — SIGNIFICANT CHANGE UP (ref 27–34)
MCHC RBC-ENTMCNC: 35.2 G/DL — SIGNIFICANT CHANGE UP (ref 32–36)
MCV RBC AUTO: 87.3 FL — SIGNIFICANT CHANGE UP (ref 80–100)
MONOCYTES # BLD AUTO: 0.37 K/UL — SIGNIFICANT CHANGE UP (ref 0–0.9)
NEUTROPHILS # BLD AUTO: 2.91 K/UL — SIGNIFICANT CHANGE UP (ref 1.8–7.4)
NEUTROPHILS NFR BLD AUTO: 67.5 % — SIGNIFICANT CHANGE UP (ref 43–77)
NRBC BLD AUTO-RTO: 0 /100 WBCS — SIGNIFICANT CHANGE UP (ref 0–0)
PLATELET # BLD AUTO: 182 K/UL — SIGNIFICANT CHANGE UP (ref 150–400)
POTASSIUM SERPL-MCNC: 4.6 MMOL/L — SIGNIFICANT CHANGE UP (ref 3.5–5.3)
POTASSIUM SERPL-SCNC: 4.6 MMOL/L — SIGNIFICANT CHANGE UP (ref 3.5–5.3)
PROT SERPL-MCNC: 7 G/DL — SIGNIFICANT CHANGE UP (ref 6–8.3)
PROTHROM AB SERPL-ACNC: 10.4 SEC — SIGNIFICANT CHANGE UP (ref 9.9–13.4)
RBC # FLD: 12.5 % — SIGNIFICANT CHANGE UP (ref 10.3–14.5)
SODIUM SERPL-SCNC: 122 MMOL/L — LOW (ref 135–145)
TROPONIN I, HIGH SENSITIVITY RESULT: 14.3 NG/L — SIGNIFICANT CHANGE UP
WBC # BLD: 4.31 K/UL — SIGNIFICANT CHANGE UP (ref 3.8–10.5)
WBC # FLD AUTO: 4.31 K/UL — SIGNIFICANT CHANGE UP (ref 3.8–10.5)

## 2025-03-15 PROCEDURE — 70450 CT HEAD/BRAIN W/O DYE: CPT | Mod: 26,XU

## 2025-03-15 PROCEDURE — 70498 CT ANGIOGRAPHY NECK: CPT | Mod: 26

## 2025-03-15 PROCEDURE — 93010 ELECTROCARDIOGRAM REPORT: CPT

## 2025-03-15 PROCEDURE — 99285 EMERGENCY DEPT VISIT HI MDM: CPT

## 2025-03-15 PROCEDURE — 99223 1ST HOSP IP/OBS HIGH 75: CPT

## 2025-03-15 PROCEDURE — 70496 CT ANGIOGRAPHY HEAD: CPT | Mod: 26

## 2025-03-15 RX ORDER — LISINOPRIL 5 MG/1
20 TABLET ORAL
Refills: 0 | Status: DISCONTINUED | OUTPATIENT
Start: 2025-03-15 | End: 2025-03-18

## 2025-03-15 RX ORDER — HEPARIN SODIUM 1000 [USP'U]/ML
5000 INJECTION INTRAVENOUS; SUBCUTANEOUS EVERY 8 HOURS
Refills: 0 | Status: DISCONTINUED | OUTPATIENT
Start: 2025-03-15 | End: 2025-03-18

## 2025-03-15 RX ORDER — ASPIRIN 325 MG
81 TABLET ORAL DAILY
Refills: 0 | Status: DISCONTINUED | OUTPATIENT
Start: 2025-03-15 | End: 2025-03-18

## 2025-03-15 RX ORDER — SODIUM CHLORIDE 9 G/1000ML
1000 INJECTION, SOLUTION INTRAVENOUS
Refills: 0 | Status: DISCONTINUED | OUTPATIENT
Start: 2025-03-15 | End: 2025-03-18

## 2025-03-15 RX ORDER — INSULIN LISPRO 100 U/ML
INJECTION, SOLUTION INTRAVENOUS; SUBCUTANEOUS AT BEDTIME
Refills: 0 | Status: DISCONTINUED | OUTPATIENT
Start: 2025-03-15 | End: 2025-03-18

## 2025-03-15 RX ORDER — ATORVASTATIN CALCIUM 80 MG/1
20 TABLET, FILM COATED ORAL AT BEDTIME
Refills: 0 | Status: DISCONTINUED | OUTPATIENT
Start: 2025-03-15 | End: 2025-03-18

## 2025-03-15 RX ORDER — METFORMIN HYDROCHLORIDE 850 MG/1
1000 TABLET ORAL
Refills: 0 | Status: DISCONTINUED | OUTPATIENT
Start: 2025-03-15 | End: 2025-03-18

## 2025-03-15 RX ORDER — INSULIN LISPRO 100 U/ML
INJECTION, SOLUTION INTRAVENOUS; SUBCUTANEOUS
Refills: 0 | Status: DISCONTINUED | OUTPATIENT
Start: 2025-03-15 | End: 2025-03-18

## 2025-03-15 RX ORDER — GLUCAGON 3 MG/1
1 POWDER NASAL ONCE
Refills: 0 | Status: DISCONTINUED | OUTPATIENT
Start: 2025-03-15 | End: 2025-03-18

## 2025-03-15 RX ORDER — CARVEDILOL 3.12 MG/1
12.5 TABLET, FILM COATED ORAL EVERY 12 HOURS
Refills: 0 | Status: DISCONTINUED | OUTPATIENT
Start: 2025-03-15 | End: 2025-03-18

## 2025-03-15 RX ORDER — DEXTROSE 50 % IN WATER 50 %
12.5 SYRINGE (ML) INTRAVENOUS ONCE
Refills: 0 | Status: DISCONTINUED | OUTPATIENT
Start: 2025-03-15 | End: 2025-03-18

## 2025-03-15 RX ORDER — LISINOPRIL 5 MG/1
1 TABLET ORAL
Refills: 0 | DISCHARGE

## 2025-03-15 RX ORDER — DEXTROSE 50 % IN WATER 50 %
25 SYRINGE (ML) INTRAVENOUS ONCE
Refills: 0 | Status: DISCONTINUED | OUTPATIENT
Start: 2025-03-15 | End: 2025-03-18

## 2025-03-15 RX ORDER — ALPRAZOLAM 0.5 MG
0.25 TABLET, EXTENDED RELEASE 24 HR ORAL ONCE
Refills: 0 | Status: DISCONTINUED | OUTPATIENT
Start: 2025-03-15 | End: 2025-03-15

## 2025-03-15 RX ORDER — CARVEDILOL 3.12 MG/1
1 TABLET, FILM COATED ORAL
Refills: 0 | DISCHARGE

## 2025-03-15 RX ORDER — DEXTROSE 50 % IN WATER 50 %
15 SYRINGE (ML) INTRAVENOUS ONCE
Refills: 0 | Status: DISCONTINUED | OUTPATIENT
Start: 2025-03-15 | End: 2025-03-18

## 2025-03-15 RX ADMIN — Medication 75 MILLILITER(S): at 22:48

## 2025-03-15 RX ADMIN — Medication 1000 MILLILITER(S): at 19:20

## 2025-03-15 RX ADMIN — Medication 1000 MILLILITER(S): at 20:15

## 2025-03-15 NOTE — H&P ADULT - HISTORY OF PRESENT ILLNESS
This is a 80 y/o F with PMH of HTN, DM2, Dyslipidemia, CAD s/p MI s/p PVCI, and Anxiety who presented with 3 days of occipital headache radiating to her upper back of the neck, no associated visual changes, abnormal sensation, focal muscle weakness, or changes of speech. Patient was found with elevated BP, denies any medication non compliance, but reportedly on compliant with diet restriction. AT the ED her BP was 200/78, serum sodium level of 122 mmol/L, and her CTA head & neck showed a possible small saccular aneurysm in addition to a moderate to severe stenosis of right ICA. Patient currently feels better but still with discomfort at her upper posterior neck. This is a 78 y/o F with PMH of HTN, DM2, Dyslipidemia, CAD s/p MI s/p PCI, and Anxiety who presented with 3 days of occipital headache radiating to her upper back of the neck, no associated visual changes, abnormal sensation, focal muscle weakness, or changes of speech. Patient was found with elevated BP, denies any medication non compliance, but reportedly on compliant with diet restriction. AT the ED her BP was 200/78, serum sodium level of 122 mmol/L, and her CTA head & neck showed a possible small saccular aneurysm in addition to a moderate to severe stenosis of right ICA. Patient currently feels better but still with discomfort at her upper posterior neck.

## 2025-03-15 NOTE — ED PROVIDER NOTE - ENMT, MLM
Airway patent, Nasal mucosa clear. Mouth with normal mucosa. Throat has no vesicles, no oropharyngeal exudates and uvula is midline. neck supple. no meningeal signs. n

## 2025-03-15 NOTE — H&P ADULT - ASSESSMENT
80 y/o F with PMH of HTN, DM2, Dyslipidemia, CAD s/p MI s/p PCI, and Anxiety, presented with occipital headache, uncontrolled HTN, and hyponatremia.

## 2025-03-15 NOTE — H&P ADULT - PROBLEM SELECTOR PLAN 4
currently controlled, hold HCTZ, continue on Carvedilol & Lisinopril with hold parameters, in addition to low dose Xanax as stated above, Cardiology consult with Dr. Nuñez was called.

## 2025-03-15 NOTE — H&P ADULT - NSHPLABSRESULTS_GEN_ALL_CORE
-                          8.7    4.31  )-----------( 182      ( 15 Mar 2025 18:40 )             24.7       15 Mar 2025 18:40    122    |  88     |  33     ----------------------------<  106    4.6     |  24     |  1.37     Ca    8.9        15 Mar 2025 18:40    TPro  7.0    /  Alb  3.4    /  TBili  0.5    /  DBili  x      /  AST  19     /  ALT  34     /  AlkPhos  83     15 Mar 2025 18:40    LIVER FUNCTIONS - ( 15 Mar 2025 18:40 )  Alb: 3.4 g/dL / Pro: 7.0 g/dL / ALK PHOS: 83 U/L / ALT: 34 U/L / AST: 19 U/L / GGT: x           PT/INR - ( 15 Mar 2025 18:40 )   PT: 10.4 sec;   INR: 0.88 ratio    PTT - ( 15 Mar 2025 18:40 )  PTT:32.3 sec    Urinalysis Basic - ( 15 Mar 2025 18:40 )  Color: x / Appearance: x / SG: x / pH: x  Gluc: 106 mg/dL / Ketone: x  / Bili: x / Urobili: x   Blood: x / Protein: x / Nitrite: x   Leuk Esterase: x / RBC: x / WBC x   Sq Epi: x / Non Sq Epi: x / Bacteria: x    Troponin I, High Sensitivity (03.15.25 @ 18:40)   Troponin I, High Sensitivity Result: 14.3     CT ANGIO NECK (W)AW IC CT ANGIO BRAIN (W)AW IC & CT BRAIN   PROCEDURE DATE:  03/15/2025    HISTORY:HA/Neck pain x 2-3d, HTN  CONTRAST: NONE: 90 cc administered, 10 cc discarded.  COMPARISON: CT of the head with CTA head and neck March 6, 2025  IMPRESSION:  CT HEAD:  1.  No evidence of acute intracranial hemorrhage or midline shift.  2.  Chronic small vessel disease. If there is continued concern for acute neurologic compromise, recommend MRI of the brain for further evaluation.  CTA BRAIN:  1.  Atherosclerotic calcification in the intracranial segments of the ICAs, resulting in moderate to severe luminal narrowing on the right.  2.  0.2 cm vascular outpouching in the left supraclinoid segment, likely small saccular aneurysm versus vascular infundibulum.  CTA NECK:  1.  No evidence of critical stenosis by NASCET criteria.  2.  Subcentimeter nodule in the left lung apex. Recommend follow-up imaging as per Fleischner criteria. -                          8.7    4.31  )-----------( 182      ( 15 Mar 2025 18:40 )             24.7       15 Mar 2025 18:40    122    |  88     |  33     ----------------------------<  106    4.6     |  24     |  1.37     Ca    8.9        15 Mar 2025 18:40    TPro  7.0    /  Alb  3.4    /  TBili  0.5    /  DBili  x      /  AST  19     /  ALT  34     /  AlkPhos  83     15 Mar 2025 18:40    LIVER FUNCTIONS - ( 15 Mar 2025 18:40 )  Alb: 3.4 g/dL / Pro: 7.0 g/dL / ALK PHOS: 83 U/L / ALT: 34 U/L / AST: 19 U/L / GGT: x           PT/INR - ( 15 Mar 2025 18:40 )   PT: 10.4 sec;   INR: 0.88 ratio    PTT - ( 15 Mar 2025 18:40 )  PTT:32.3 sec    Urinalysis Basic - ( 15 Mar 2025 18:40 )  Color: x / Appearance: x / SG: x / pH: x  Gluc: 106 mg/dL / Ketone: x  / Bili: x / Urobili: x   Blood: x / Protein: x / Nitrite: x   Leuk Esterase: x / RBC: x / WBC x   Sq Epi: x / Non Sq Epi: x / Bacteria: x    Troponin I, High Sensitivity (03.15.25 @ 18:40)   Troponin I, High Sensitivity Result: 14.3         CT ANGIO NECK (W)AW IC CT ANGIO BRAIN (W)AW IC & CT BRAIN   PROCEDURE DATE:  03/15/2025    HISTORY:HA/Neck pain x 2-3d, HTN  CONTRAST: NONE: 90 cc administered, 10 cc discarded.  COMPARISON: CT of the head with CTA head and neck March 6, 2025  IMPRESSION:  CT HEAD:  1.  No evidence of acute intracranial hemorrhage or midline shift.  2.  Chronic small vessel disease. If there is continued concern for acute neurologic compromise, recommend MRI of the brain for further evaluation.  CTA BRAIN:  1.  Atherosclerotic calcification in the intracranial segments of the ICAs, resulting in moderate to severe luminal narrowing on the right.  2.  0.2 cm vascular outpouching in the left supraclinoid segment, likely small saccular aneurysm versus vascular infundibulum.  CTA NECK:  1.  No evidence of critical stenosis by NASCET criteria.  2.  Subcentimeter nodule in the left lung apex. Recommend follow-up imaging as per Fleischner criteria.        EKG:    As per my review shows SR at 80/min, with 1st degree AV block, normal QTc intervals, LAE, old septal infarct, normal QRS voltage, duration, and axis (+30), with normal transition, nonspecific ST-T abnormality.    -

## 2025-03-15 NOTE — ED PROVIDER NOTE - DIFFERENTIAL DIAGNOSIS
Rule out acute CVA, electrolyte abnormality, leukocytosis, hypertensive urgency, other acute pathology Differential Diagnosis

## 2025-03-15 NOTE — H&P ADULT - NSHPREVIEWOFSYSTEMS_GEN_ALL_CORE
-    CONSTITUTIONAL: No fever or chills.  EYES: No eye pain, visual disturbances, or discharge.  ENMT:  No difficulty hearing, vertigo, sinus or throat pain.  NECK: No pain or stiffness.	  RESPIRATORY: No cough, wheezing, or hemoptysis; No shortness of breath.  CARDIOVASCULAR: No chest pain, palpitations, dizziness, or leg swelling.  GASTROINTESTINAL: No abdominal pain, no nausea, vomiting, or hematemesis; No diarrhea or Change in bowel habits. No melena or hematochezia.  GENITOURINARY: No dysuria, frequency, hematuria, or incontinence.  NEUROLOGICAL: (+) occipital headaches, no focal muscle weakness, numbness, or tremors.  SKIN: No itching, burning or rashes.  MUSCULOSKELETAL: No joint swelling or pain.  PSYCHIATRIC: No depression, anxiety, or agitation.  HEME/LYMPH: No easy bruising, bleeding gums, or nose bleed.  ALLERGY AND IMMUNOLOGIC: No hives or eczema.

## 2025-03-15 NOTE — ED PROVIDER NOTE - OBJECTIVE STATEMENT
79-year-old female with a history of hypertension, hyperlipidemia, diabetes, CAD presents with having some occipital headache/neck pain over the past 2 to 3 days.  Patient was found to have high blood pressure as a result.  The patient had similar symptoms at the beginning of the month, was seen in the ED.  No numbness or tingling the arms or legs.  No focal weakness.  No cough/URI.  No recent fall or trauma.  No acute chest pain.  No shortness of breath.  No dyspnea on exertion or easy fatigue.  No recent travel or immobilization.  No aggravating or alleviating factors otherwise noted.  No other acute complaints.

## 2025-03-15 NOTE — H&P ADULT - PROBLEM SELECTOR PLAN 8
Started her on UFH 5000 units sub Q every 8h for DVT prophylaxis, also continue on her daily low dose Aspirin.

## 2025-03-15 NOTE — ED ADULT TRIAGE NOTE - BP NONINVASIVE DIASTOLIC (MM HG)
Writer spoke with Dr Alicia regarding cardizem gtt. MD explained that writer should continue cardizem gtt @ 5 cc/h and continue to monitor BP. When HR comes down will reassess transitioning to PO.     If HR remains less than 70 consistantly writer is to give PT home dose of metoprolol and turn off cardizem gtt 1 HOUR AFTER giving metoprolol.     Dr Alicia is on call for the night and invited any and all questions or concerns. Phone # [phone] to get page out.      78

## 2025-03-15 NOTE — H&P ADULT - NSHPLANGTRANSLATORFT_GEN_A_CORE
Translated by son at the bedside as translation services line didn't work earlier( couldn't understand her special dialect).

## 2025-03-15 NOTE — ED PROVIDER NOTE - NEUROLOGICAL, MLM
Alert and oriented, no focal deficits, no motor or sensory deficits. Normal nonfocal detailed neurologic exam Azathioprine Counseling:  I discussed with the patient the risks of azathioprine including but not limited to myelosuppression, immunosuppression, hepatotoxicity, lymphoma, and infections.  The patient understands that monitoring is required including baseline LFTs, Creatinine, possible TPMP genotyping and weekly CBCs for the first month and then every 2 weeks thereafter.  The patient verbalized understanding of the proper use and possible adverse effects of azathioprine.  All of the patient's questions and concerns were addressed.

## 2025-03-15 NOTE — H&P ADULT - PROBLEM SELECTOR PLAN 3
an incidental finding on CTA, small, also a moderate to severe stenosis of right ICA, vascular surgery was consulted by ED team prior to admission, recommended outpatient f/u, ED attending explained to patient & son that they need an outpatient f/u for this issue, and can f/u with neurovascular Dr. Quintin Perry for further management as needed, Please schedule an appointment on discharge.

## 2025-03-15 NOTE — ED ADULT NURSE REASSESSMENT NOTE - NS ED NURSE REASSESS COMMENT FT1
patient A&Ox3 in no acute distress no chest pain no dizziness no blurred vision, moving all extremities no facial droop clear speech at this time

## 2025-03-15 NOTE — H&P ADULT - PROBLEM SELECTOR PLAN 1
Moderate, asymptomatic, recurrent, ML relater to thiazide diuretic use, hold HCTZ, patient received 1000 ml NS bolus earlier by ED team, maintain at 75 ml/h NS, trend serum sodium level & avoid rapid correction given the risk of ODS, goal is 6-8 meq/L per 24h, nephrology consult with Dr. Arana was called.

## 2025-03-15 NOTE — H&P ADULT - PROBLEM SELECTOR PLAN 5
controlled, Onglyza is non formulary, hold Glipizide, continue on Metformin, in addition to corrective insulin Lispro scale coverage before meals & at bedtime, check glycohemoglobin level in am.

## 2025-03-15 NOTE — ED ADULT NURSE NOTE - OBJECTIVE STATEMENT
patient A&Ox3 in no acute distress c/o of neck pain dizziness and high BP on and off started 3 days ago , patient denied chest pain no difficulty breathing g, moving all extremities no facial droop clear speech at this time , place on cardiac monitor daughter by bed side translate

## 2025-03-15 NOTE — ED ADULT NURSE NOTE - NSFALLHARMRISKINTERV_ED_ALL_ED
Assistance OOB with selected safe patient handling equipment if applicable/Communicate risk of Fall with Harm to all staff, patient, and family/Monitor gait and stability/Provide patient with walking aids/Provide visual cue: red socks, yellow wristband, yellow gown, etc/Reinforce activity limits and safety measures with patient and family/Bed in lowest position, wheels locked, appropriate side rails in place/Call bell, personal items and telephone in reach/Instruct patient to call for assistance before getting out of bed/chair/stretcher/Non-slip footwear applied when patient is off stretcher/[place] to call system/Physically safe environment - no spills, clutter or unnecessary equipment/Purposeful Proactive Rounding/Room/bathroom lighting operational, light cord in reach

## 2025-03-15 NOTE — H&P ADULT - NSHPPHYSICALEXAM_GEN_ALL_CORE
-    Vital Signs Last 24 Hrs  T(C): 37.1 (15 Mar 2025 21:58), Max: 37.1 (15 Mar 2025 21:58)  T(F): 98.7 (15 Mar 2025 21:58), Max: 98.7 (15 Mar 2025 21:58)  HR: 90 (15 Mar 2025 21:58) (80 - 90)  BP: 147/68 (15 Mar 2025 21:58) (147/68 - 200/78)  BP(mean): --  RR: 16 (15 Mar 2025 21:58) (14 - 18)  SpO2: 100% (15 Mar 2025 21:58) (99% - 100%)    Parameters below as of 15 Mar 2025 21:58  Patient On (Oxygen Delivery Method): room air -    Vital Signs Last 24 Hrs  T(C): 37.1 (15 Mar 2025 21:58), Max: 37.1 (15 Mar 2025 21:58)  T(F): 98.7 (15 Mar 2025 21:58), Max: 98.7 (15 Mar 2025 21:58)  HR: 90 (15 Mar 2025 21:58) (80 - 90)  BP: 147/68 (15 Mar 2025 21:58) (147/68 - 200/78)  BP(mean): --  RR: 16 (15 Mar 2025 21:58) (14 - 18)  SpO2: 100% (15 Mar 2025 21:58) (99% - 100%)    Parameters below as of 15 Mar 2025 21:58  Patient On (Oxygen Delivery Method): room air        PHYSICAL EXAM:  		  GENERAL: NAD, well-groomed, well-developed.  HEAD:  Atraumatic, Norm cephalic.  EYES: PERRLA, conjunctiva clear.  ENMT: no nasal discharge, MMM.   NECK: Supple, No JVD.  NERVOUS SYSTEM:  Alert & oriented X3, neurologically intact, no abnormal speech, no facial asymmetry, no ataxia, no focal muscle weakness.  CHEST/LUNG: Good air entry B/L, no rales, rhonchi, or wheezing.  HEART: Normal S1 & acc S2, grade III/VI MATT max over the cardiac base propagated to the apex, no extra sounds.  ABDOMEN: Soft, non-tender, non-distended; bowel sounds present, no palpable masses or organomegaly.  EXTREMITIES:  No clubbing, cyanosis, or edema.  VASCULAR: 2+ radial, DPA / PTA pulses B/L.  SKIN: No rashes or lesions.  PSYCH: normal affect & behavior.

## 2025-03-15 NOTE — ED PROVIDER NOTE - PROGRESS NOTE DETAILS
pt's daughter was at bedside earlier, discussed low Na results and advised admission pending ct; now son at bedside, discussed results of CT showing ica stenosis and small aneurysm, will d/w neurosurg via transfer center to discuss whether pt requires transfer at this time vs outpt f/u after admission speaking with transfer center, case d/w on call neurosurgeon, advises no need for urgent/emergent transfer for cta findings, pt on discharge can f/u with neurovascular Dr. Quintin Perry for further management as needed

## 2025-03-15 NOTE — H&P ADULT - PROBLEM SELECTOR PLAN 2
Telephone Encounter by Jossie Plasencia CMA at 10/17/18 08:44 AM     Author:  Jossie Plasencia CMA Service:  (none) Author Type:  Medical Assistant     Filed:  10/17/18 08:44 AM Encounter Date:  10/17/2018 Status:  Signed     :  Jossie Plasencia CMA (Medical Assistant)            Last office visit  10/4/2018 -[JR1.1M] PLAN:  Patient has severe deformities of both lower extremities prior to surgery, he is now appropriately aligned but there right TKA shows evidence of varus subsidence of his tibial implant.  Malalignment of the lower extremities may be magnifying and contributing.  We would like to include  lateral wedge in his right foot orthotic as part of the overall orthotic order for him by Dr. Gallo.  We will see how he responds to the new orthotics and possibly replacement orthotic on his left ankle.  Recheck in 2 months, discussed the possibility of revision total knee arthroplasty which I broached with him today.     In the meantime, activities as tolerated and continue exercise regimen.      Procedure:  None today     Electronically Signed by:    Demetrio Pierce MD , 10/4/2018   [JR1.1C]    Routing to Dr. Willy Gallo team also[JR1.1M]       Revision History        User Key Date/Time User Provider Type Action    > JR1.1 10/17/18 08:44 AM Jossie Plasencia CMA Medical Assistant Sign    C - Copied, M - Manual             CT not suggestive of an organic cause, patient noted with obvious anxiety during my encounter, ML also at home once she checks her BP, responded well to a low dose of Alprazolam, started her on 0.25 mg BID, monitor her BP.

## 2025-03-15 NOTE — H&P ADULT - PROBLEM SELECTOR PLAN 6
ML CKD stage 3 as per old records review, stable, avoid nephrotoxins, and renally dose meds, monitor BUN/cr.

## 2025-03-15 NOTE — ED PROVIDER NOTE - CLINICAL SUMMARY MEDICAL DECISION MAKING FREE TEXT BOX
Acute neck pain, headache with hypertension over the past 2 to 3 days.  Will check labs, CT/CTA, IV fluids, reeval

## 2025-03-16 DIAGNOSIS — E87.1 HYPO-OSMOLALITY AND HYPONATREMIA: ICD-10-CM

## 2025-03-16 DIAGNOSIS — I67.1 CEREBRAL ANEURYSM, NONRUPTURED: ICD-10-CM

## 2025-03-16 DIAGNOSIS — E78.5 HYPERLIPIDEMIA, UNSPECIFIED: ICD-10-CM

## 2025-03-16 DIAGNOSIS — I10 ESSENTIAL (PRIMARY) HYPERTENSION: ICD-10-CM

## 2025-03-16 DIAGNOSIS — N28.9 DISORDER OF KIDNEY AND URETER, UNSPECIFIED: ICD-10-CM

## 2025-03-16 DIAGNOSIS — G44.209 TENSION-TYPE HEADACHE, UNSPECIFIED, NOT INTRACTABLE: ICD-10-CM

## 2025-03-16 DIAGNOSIS — Z29.9 ENCOUNTER FOR PROPHYLACTIC MEASURES, UNSPECIFIED: ICD-10-CM

## 2025-03-16 DIAGNOSIS — D64.9 ANEMIA, UNSPECIFIED: ICD-10-CM

## 2025-03-16 DIAGNOSIS — E11.9 TYPE 2 DIABETES MELLITUS WITHOUT COMPLICATIONS: ICD-10-CM

## 2025-03-16 PROBLEM — I25.10 ATHEROSCLEROTIC HEART DISEASE OF NATIVE CORONARY ARTERY WITHOUT ANGINA PECTORIS: Chronic | Status: ACTIVE | Noted: 2025-03-06

## 2025-03-16 LAB
A1C WITH ESTIMATED AVERAGE GLUCOSE RESULT: 7.7 % — HIGH (ref 4–5.6)
ANION GAP SERPL CALC-SCNC: 12 MMOL/L — SIGNIFICANT CHANGE UP (ref 5–17)
BUN SERPL-MCNC: 28 MG/DL — HIGH (ref 7–23)
CALCIUM SERPL-MCNC: 8.6 MG/DL — SIGNIFICANT CHANGE UP (ref 8.4–10.5)
CHLORIDE SERPL-SCNC: 95 MMOL/L — LOW (ref 96–108)
CO2 SERPL-SCNC: 22 MMOL/L — SIGNIFICANT CHANGE UP (ref 22–31)
CREAT SERPL-MCNC: 1.29 MG/DL — SIGNIFICANT CHANGE UP (ref 0.5–1.3)
EGFR: 42 ML/MIN/1.73M2 — LOW
EGFR: 42 ML/MIN/1.73M2 — LOW
ESTIMATED AVERAGE GLUCOSE: 174 MG/DL — HIGH (ref 68–114)
GLUCOSE BLDC GLUCOMTR-MCNC: 104 MG/DL — HIGH (ref 70–99)
GLUCOSE BLDC GLUCOMTR-MCNC: 148 MG/DL — HIGH (ref 70–99)
GLUCOSE BLDC GLUCOMTR-MCNC: 180 MG/DL — HIGH (ref 70–99)
GLUCOSE BLDC GLUCOMTR-MCNC: 183 MG/DL — HIGH (ref 70–99)
GLUCOSE BLDC GLUCOMTR-MCNC: 81 MG/DL — SIGNIFICANT CHANGE UP (ref 70–99)
GLUCOSE SERPL-MCNC: 84 MG/DL — SIGNIFICANT CHANGE UP (ref 70–99)
HCT VFR BLD CALC: 25.6 % — LOW (ref 34.5–45)
HGB BLD-MCNC: 8.8 G/DL — LOW (ref 11.5–15.5)
MAGNESIUM SERPL-MCNC: 1.5 MG/DL — LOW (ref 1.6–2.6)
MCHC RBC-ENTMCNC: 30.4 PG — SIGNIFICANT CHANGE UP (ref 27–34)
MCHC RBC-ENTMCNC: 34.4 G/DL — SIGNIFICANT CHANGE UP (ref 32–36)
MCV RBC AUTO: 88.6 FL — SIGNIFICANT CHANGE UP (ref 80–100)
NRBC BLD AUTO-RTO: 0 /100 WBCS — SIGNIFICANT CHANGE UP (ref 0–0)
PLATELET # BLD AUTO: 193 K/UL — SIGNIFICANT CHANGE UP (ref 150–400)
POTASSIUM SERPL-MCNC: 3.8 MMOL/L — SIGNIFICANT CHANGE UP (ref 3.5–5.3)
POTASSIUM SERPL-SCNC: 3.8 MMOL/L — SIGNIFICANT CHANGE UP (ref 3.5–5.3)
RBC # BLD: 2.89 M/UL — LOW (ref 3.8–5.2)
RBC # FLD: 12.6 % — SIGNIFICANT CHANGE UP (ref 10.3–14.5)
SODIUM SERPL-SCNC: 129 MMOL/L — LOW (ref 135–145)
TSH SERPL-MCNC: 4.61 UIU/ML — HIGH (ref 0.27–4.2)
WBC # BLD: 3.74 K/UL — LOW (ref 3.8–10.5)
WBC # FLD AUTO: 3.74 K/UL — LOW (ref 3.8–10.5)

## 2025-03-16 PROCEDURE — 99233 SBSQ HOSP IP/OBS HIGH 50: CPT

## 2025-03-16 RX ORDER — AMLODIPINE BESYLATE 10 MG/1
5 TABLET ORAL DAILY
Refills: 0 | Status: DISCONTINUED | OUTPATIENT
Start: 2025-03-16 | End: 2025-03-18

## 2025-03-16 RX ORDER — ALPRAZOLAM 0.5 MG
0.25 TABLET, EXTENDED RELEASE 24 HR ORAL
Refills: 0 | Status: DISCONTINUED | OUTPATIENT
Start: 2025-03-16 | End: 2025-03-18

## 2025-03-16 RX ORDER — MAGNESIUM SULFATE 500 MG/ML
2 SYRINGE (ML) INJECTION ONCE
Refills: 0 | Status: COMPLETED | OUTPATIENT
Start: 2025-03-16 | End: 2025-03-16

## 2025-03-16 RX ADMIN — Medication 81 MILLIGRAM(S): at 13:01

## 2025-03-16 RX ADMIN — HEPARIN SODIUM 5000 UNIT(S): 1000 INJECTION INTRAVENOUS; SUBCUTANEOUS at 07:01

## 2025-03-16 RX ADMIN — AMLODIPINE BESYLATE 5 MILLIGRAM(S): 10 TABLET ORAL at 18:04

## 2025-03-16 RX ADMIN — Medication 25 GRAM(S): at 13:02

## 2025-03-16 RX ADMIN — INSULIN LISPRO 2: 100 INJECTION, SOLUTION INTRAVENOUS; SUBCUTANEOUS at 12:56

## 2025-03-16 RX ADMIN — Medication 0.25 MILLIGRAM(S): at 09:19

## 2025-03-16 RX ADMIN — LISINOPRIL 20 MILLIGRAM(S): 5 TABLET ORAL at 18:05

## 2025-03-16 RX ADMIN — ATORVASTATIN CALCIUM 20 MILLIGRAM(S): 80 TABLET, FILM COATED ORAL at 21:28

## 2025-03-16 RX ADMIN — METFORMIN HYDROCHLORIDE 1000 MILLIGRAM(S): 850 TABLET ORAL at 08:41

## 2025-03-16 RX ADMIN — HEPARIN SODIUM 5000 UNIT(S): 1000 INJECTION INTRAVENOUS; SUBCUTANEOUS at 14:14

## 2025-03-16 RX ADMIN — LISINOPRIL 20 MILLIGRAM(S): 5 TABLET ORAL at 07:01

## 2025-03-16 RX ADMIN — Medication 75 MILLILITER(S): at 09:22

## 2025-03-16 RX ADMIN — CARVEDILOL 12.5 MILLIGRAM(S): 3.12 TABLET, FILM COATED ORAL at 18:05

## 2025-03-16 RX ADMIN — Medication 0.25 MILLIGRAM(S): at 18:05

## 2025-03-16 RX ADMIN — CARVEDILOL 12.5 MILLIGRAM(S): 3.12 TABLET, FILM COATED ORAL at 07:01

## 2025-03-16 RX ADMIN — METFORMIN HYDROCHLORIDE 1000 MILLIGRAM(S): 850 TABLET ORAL at 18:04

## 2025-03-16 RX ADMIN — HEPARIN SODIUM 5000 UNIT(S): 1000 INJECTION INTRAVENOUS; SUBCUTANEOUS at 21:29

## 2025-03-16 NOTE — CONSULT NOTE ADULT - SUBJECTIVE AND OBJECTIVE BOX
[facility]  Dr. Arana, Dr. Lomeli, Dr. Christy, Dr. Vuong, Dr. Huitron, Dr. Donis                                        Formerly [facility], [facility], [address]                                                 [address] [address]  Ph: [phone]  Fax: [phone]                                         Ph: [phone]  Fax: [phone]      Patient is a 79y old  Female who presents with a chief complaint of Neck pain & high BP. (15 Mar 2025 21:20)    HPI:  This is a 78 y/o F with PMH of HTN, DM2, Dyslipidemia, CAD s/p MI s/p PCI, and Anxiety who presented with 3 days of occipital headache radiating to her upper back of the neck, no associated visual changes, abnormal sensation, focal muscle weakness, or changes of speech. Patient was found with elevated BP, denies any medication non compliance, but reportedly on compliant with diet restriction. AT the ED her BP was 200/78, serum sodium level of 122 mmol/L, and her CTA head & neck showed a possible small saccular aneurysm in addition to a moderate to severe stenosis of right ICA. Patient currently feels better but still with discomfort at her upper posterior neck. (15 Mar 2025 21:20)      PAST MEDICAL HISTORY:  Diabetes    Hypertension    Dyslipidemia    CAD (coronary artery disease)        PAST SURGICAL HISTORY:  No significant past surgical history        FAMILY HISTORY:  Family history of coronary artery disease (Sibling)    Family history of coronary artery disease (Sibling)        SOCIAL HISTORY:    Allergies    No Known Allergies    Intolerances      Home Medications:  aspirin 81 mg oral tablet: 1 tab(s) orally once a day (15 Mar 2025 21:35)  atorvastatin 20 mg oral tablet: 1 tab(s) orally (15 Mar 2025 21:26)  carvedilol 12.5 mg oral tablet: 1 tab(s) orally 2 times a day (15 Mar 2025 21:28)  glipiZIDE 5 mg oral tablet: 1 tab(s) orally once a day (15 Mar 2025 21:35)  hydroCHLOROthiazide 25 mg oral tablet: 1 tab(s) orally (15 Mar 2025 21:34)  lisinopril 20 mg oral tablet: 1 tab(s) orally 2 times a day (15 Mar 2025 21:33)  metFORMIN 1000 mg oral tablet: 1 tab(s) orally 2 times a day (15 Mar 2025 21:35)  Onglyza 5 mg oral tablet: 1 tab(s) orally once a day (15 Mar 2025 21:35)    MEDICATIONS  (STANDING):  ALPRAZolam 0.25 milliGRAM(s) Oral two times a day  aspirin enteric coated 81 milliGRAM(s) Oral daily  atorvastatin 20 milliGRAM(s) Oral at bedtime  carvedilol 12.5 milliGRAM(s) Oral every 12 hours  dextrose 5%. 1000 milliLiter(s) (100 mL/Hr) IV Continuous <Continuous>  dextrose 5%. 1000 milliLiter(s) (50 mL/Hr) IV Continuous <Continuous>  dextrose 50% Injectable 25 Gram(s) IV Push once  dextrose 50% Injectable 12.5 Gram(s) IV Push once  dextrose 50% Injectable 25 Gram(s) IV Push once  glucagon  Injectable 1 milliGRAM(s) IntraMuscular once  heparin   Injectable 5000 Unit(s) SubCutaneous every 8 hours  insulin lispro (ADMELOG) corrective regimen sliding scale   SubCutaneous three times a day before meals  insulin lispro (ADMELOG) corrective regimen sliding scale   SubCutaneous at bedtime  lisinopril 20 milliGRAM(s) Oral two times a day  metFORMIN 1000 milliGRAM(s) Oral two times a day  sodium chloride 0.9%. 1000 milliLiter(s) (75 mL/Hr) IV Continuous <Continuous>    MEDICATIONS  (PRN):  dextrose Oral Gel 15 Gram(s) Oral once PRN Blood Glucose LESS THAN 70 milliGRAM(s)/deciliter      REVIEW OF SYSTEMS:  General:   Respiratory: No cough, SOB  Cardiovascular: No CP or Palpitations	  Gastrointestinal: No nausea, Vomiting. No diarrhea  Genitourinary: No urinary complaints	  Musculoskeletal: No leg swelling, No new rash or lesions	  Neurological: 	  all other systems negative    T(F): 98 (03-16-25 @ 09:18), Max: 98.7 (03-15-25 @ 21:58)  HR: 75 (03-16-25 @ 09:18) (75 - 90)  BP: 157/75 (03-16-25 @ 09:18) (147/68 - 200/78)  RR: 16 (03-16-25 @ 09:18) (14 - 18)  SpO2: 99% (03-16-25 @ 09:18) (96% - 100%)  Wt(kg): --    PHYSICAL EXAM:  General: NAD  Respiratory: b/l air entry  Cardiovascular: S1 S2  Gastrointestinal: soft  Extremities: edema        03-16    129[L]  |  95[L]  |  28[H]  ----------------------------<  84  3.8   |  22  |  1.29    Ca    8.6      16 Mar 2025 06:00  Mg     1.5     03-16    TPro  7.0  /  Alb  3.4  /  TBili  0.5  /  DBili  x   /  AST  19  /  ALT  34  /  AlkPhos  83  03-15                          8.8    3.74  )-----------( 193      ( 16 Mar 2025 06:00 )             25.6       Calcium: 8.6 mg/dL (03-16 @ 06:00)  Blood Urea Nitrogen: 28 mg/dL (03-16 @ 06:00)  Potassium: 3.8 mmol/L (03-16 @ 06:00)  Hemoglobin: 8.8 g/dL (03-16 @ 06:00)      Creatinine, Serum: 1.29 (03-16 @ 06:00)  Creatinine, Serum: 1.37 (03-15 @ 18:40)      Urinalysis Basic - ( 16 Mar 2025 06:00 )    Color: x / Appearance: x / SG: x / pH: x  Gluc: 84 mg/dL / Ketone: x  / Bili: x / Urobili: x   Blood: x / Protein: x / Nitrite: x   Leuk Esterase: x / RBC: x / WBC x   Sq Epi: x / Non Sq Epi: x / Bacteria: x      LIVER FUNCTIONS - ( 15 Mar 2025 18:40 )  Alb: 3.4 g/dL / Pro: 7.0 g/dL / ALK PHOS: 83 U/L / ALT: 34 U/L / AST: 19 U/L / GGT: x                       I&O's Detail             [facility]  Dr. Arana, Dr. Lomeli, Dr. Christy, Dr. Vuong, Dr. Huitron, Dr. Donis                                        [facility], [facility], [address]                                                 [address] [address]  Ph: [phone]  Fax: [phone]                                         Ph: [phone]  Fax: [phone]      Patient is a 79y old  Female who presents with a chief complaint of Neck pain & high BP. (15 Mar 2025 21:20)    HPI:  This is a 80 y/o F with PMH of HTN, DM2, Dyslipidemia, CAD s/p MI s/p PCI, and Anxiety who presented with 3 days of occipital headache radiating to her upper back of the neck, no associated visual changes, abnormal sensation, focal muscle weakness, or changes of speech. Patient was found with elevated BP, denies any medication non compliance, but reportedly on compliant with diet restriction. AT the ED her BP was 200/78, serum sodium level of 122 mmol/L, and her CTA head & neck showed a possible small saccular aneurysm in addition to a moderate to severe stenosis of right ICA. Patient currently feels better but still with discomfort at her upper posterior neck. (15 Mar 2025 21:20)    Renal consult called for hyponatremia. History obtained from chart and patient.       PAST MEDICAL HISTORY:  Diabetes    Hypertension    Dyslipidemia    CAD (coronary artery disease)        PAST SURGICAL HISTORY:  No significant past surgical history        FAMILY HISTORY:  Family history of coronary artery disease (Sibling)    Family history of coronary artery disease (Sibling)        SOCIAL HISTORY: No smoking or alcohol use     Allergies    No Known Allergies    Intolerances      Home Medications:  aspirin 81 mg oral tablet: 1 tab(s) orally once a day (15 Mar 2025 21:35)  atorvastatin 20 mg oral tablet: 1 tab(s) orally (15 Mar 2025 21:26)  carvedilol 12.5 mg oral tablet: 1 tab(s) orally 2 times a day (15 Mar 2025 21:28)  glipiZIDE 5 mg oral tablet: 1 tab(s) orally once a day (15 Mar 2025 21:35)  hydroCHLOROthiazide 25 mg oral tablet: 1 tab(s) orally (15 Mar 2025 21:34)  lisinopril 20 mg oral tablet: 1 tab(s) orally 2 times a day (15 Mar 2025 21:33)  metFORMIN 1000 mg oral tablet: 1 tab(s) orally 2 times a day (15 Mar 2025 21:35)  Onglyza 5 mg oral tablet: 1 tab(s) orally once a day (15 Mar 2025 21:35)    MEDICATIONS  (STANDING):  ALPRAZolam 0.25 milliGRAM(s) Oral two times a day  aspirin enteric coated 81 milliGRAM(s) Oral daily  atorvastatin 20 milliGRAM(s) Oral at bedtime  carvedilol 12.5 milliGRAM(s) Oral every 12 hours  dextrose 5%. 1000 milliLiter(s) (100 mL/Hr) IV Continuous <Continuous>  dextrose 5%. 1000 milliLiter(s) (50 mL/Hr) IV Continuous <Continuous>  dextrose 50% Injectable 25 Gram(s) IV Push once  dextrose 50% Injectable 12.5 Gram(s) IV Push once  dextrose 50% Injectable 25 Gram(s) IV Push once  glucagon  Injectable 1 milliGRAM(s) IntraMuscular once  heparin   Injectable 5000 Unit(s) SubCutaneous every 8 hours  insulin lispro (ADMELOG) corrective regimen sliding scale   SubCutaneous three times a day before meals  insulin lispro (ADMELOG) corrective regimen sliding scale   SubCutaneous at bedtime  lisinopril 20 milliGRAM(s) Oral two times a day  metFORMIN 1000 milliGRAM(s) Oral two times a day  sodium chloride 0.9%. 1000 milliLiter(s) (75 mL/Hr) IV Continuous <Continuous>    MEDICATIONS  (PRN):  dextrose Oral Gel 15 Gram(s) Oral once PRN Blood Glucose LESS THAN 70 milliGRAM(s)/deciliter      REVIEW OF SYSTEMS:  General: no distress  Respiratory: No cough, SOB  Cardiovascular: No CP or Palpitations	  Gastrointestinal: No nausea, Vomiting. No diarrhea  Genitourinary: No urinary complaints	  Musculoskeletal: No new rash or lesions		  all other systems negative    T(F): 98 (03-16-25 @ 09:18), Max: 98.7 (03-15-25 @ 21:58)  HR: 75 (03-16-25 @ 09:18) (75 - 90)  BP: 157/75 (03-16-25 @ 09:18) (147/68 - 200/78)  RR: 16 (03-16-25 @ 09:18) (14 - 18)  SpO2: 99% (03-16-25 @ 09:18) (96% - 100%)  Wt(kg): --    PHYSICAL EXAM:  General: NAD  Respiratory: b/l air entry  Cardiovascular: S1 S2  Gastrointestinal: soft  Extremities: no edema        03-16    129[L]  |  95[L]  |  28[H]  ----------------------------<  84  3.8   |  22  |  1.29    Ca    8.6      16 Mar 2025 06:00  Mg     1.5     03-16    TPro  7.0  /  Alb  3.4  /  TBili  0.5  /  DBili  x   /  AST  19  /  ALT  34  /  AlkPhos  83  03-15                          8.8    3.74  )-----------( 193      ( 16 Mar 2025 06:00 )             25.6       Calcium: 8.6 mg/dL (03-16 @ 06:00)  Blood Urea Nitrogen: 28 mg/dL (03-16 @ 06:00)  Potassium: 3.8 mmol/L (03-16 @ 06:00)  Hemoglobin: 8.8 g/dL (03-16 @ 06:00)      Creatinine, Serum: 1.29 (03-16 @ 06:00)  Creatinine, Serum: 1.37 (03-15 @ 18:40)      Urinalysis Basic - ( 16 Mar 2025 06:00 )    Color: x / Appearance: x / SG: x / pH: x  Gluc: 84 mg/dL / Ketone: x  / Bili: x / Urobili: x   Blood: x / Protein: x / Nitrite: x   Leuk Esterase: x / RBC: x / WBC x   Sq Epi: x / Non Sq Epi: x / Bacteria: x      LIVER FUNCTIONS - ( 15 Mar 2025 18:40 )  Alb: 3.4 g/dL / Pro: 7.0 g/dL / ALK PHOS: 83 U/L / ALT: 34 U/L / AST: 19 U/L / GGT: x                     < from: CT Angio Neck w/ IV Cont (03.15.25 @ 19:59) >    ACC: [id number] EXAM:  CT ANGIO NECK (W)AW IC   ORDERED BY: NABEEL THOMAS     ACC: [id number] EXAM:  CT ANGIO BRAIN (W)AW IC   ORDERED BY: NABEEL THOMAS     ACC: [id number] EXAM:  CT BRAIN   ORDERED BY: NABEEL THOMAS     PROCEDURE DATE:  03/15/2025          INTERPRETATION:  EXAM: CT OF THE HEAD WITHOUT CONTRAST, CTA HEAD AND NECK    HISTORY:HA/Neck pain x 2-3d, HTN    TECHNIQUE: CT of the head was obtained from the skull base to the skull   vertex. CTA of the head and neck was acquired from the lung apices to the   skull vertex. Sagittal and coronal reconstructions were subsequently   conducted. Omnipaque 350 Intravenous contrast was administered. 2-D MIP   images were provided.    CONTRAST: NONE: 90 cc administered, 10 cc discarded.    COMPARISON: CT of the head with CTA head and neck March 6, 2025    FINDINGS:    CT HEAD:  No acute intracranial hemorrhage. Areas of decreased attenuation in the   deep and periventricular white matter, compatible with chronic small   vessel disease. No hydrocephalus. The extra-axial spaces and basal   cisterns are within normal limits. No midline shift or mass effect   present.    The cranial cervical junction is within normal limits. The sella is not   expanded. No depressed calvarial fracture. Mild mucosal thickening in the   paranasal sinuses. The visualized mastoid air cells are well aerated. The   visualized orbits are status post cataract surgery.    CTA BRAIN:  The intracranial segments of the bilateral ICAs opacify with intraluminal   contrast. Atherosclerotic calcification intracranial segments of the   ICAs, resulting in moderate to severe luminal narrowing on the right and   mild to moderate luminal narrowing on the left. 0.2 cm vascular   outpouching oriented inferiorly in the left supraclinoid segment, image   260 of series [id number] and image 306 of series [id number].    The bilateral MCAs and ACAs are within normal limits. The anterior   communicating artery is unremarkable. Fairly symmetric arborization of   the bilateral MCA vascular distributions.    The bilateral vertebral arteries opacify normally with intraluminal   contrast. The vertebral arteries are codominant. The basilar artery is   unremarkable. The proximal SCAs and PCAs are within normal limits.    The dural-based sinuses opacify with contrast to the extent visualized.    CTA NECK:  There is a two-vessel arch. The common carotid arteries opacify normally   with intraluminal contrast. Atherosclerotic calcification and plaque   about the bilateral carotid bulbs, resulting in mild luminal narrowing.   The bilateral ICAs opacify normally with intraluminal contrast to the   skull base.    The vertebral arteries have normal origins. The vertebral arteries are   codominant. Focal narrowing in the left V1 segment. The vertebral   arteries opacify normally with intraluminal contrast to the skull base.    The thyroid is unremarkable.    There are degenerative changes in the cervical spine.    Atelectatic change in the bilateral lung apices. 0.4 cm nodule in the  left lung apex.      IMPRESSION:    CT HEAD:  1.  No evidence of acute intracranial hemorrhage or midline shift.  2.  Chronic small vessel disease. If there is continued concern for acute   neurologic compromise, recommend MRI of the brain for further evaluation.    CTA BRAIN:  1.  Atherosclerotic calcification in the intracranial segments of the   ICAs, resulting in moderate to severe luminal narrowing on the right.  2.  0.2 cm vascular outpouching in the left supraclinoid segment, likely   small saccular aneurysm versus vascular infundibulum.    CTA NECK:  1.  No evidence of critical stenosis by NASCET criteria.  2.  Subcentimeter nodule in the left lung apex. Recommend follow-up   imaging as per Fleischner criteria.    --- End of Report ---            KYUNG NICHOLE MD; Attending Radiologist  This document has been electronically signed. Mar 15 2025  8:19PM    < end of copied text >

## 2025-03-16 NOTE — CARE COORDINATION ASSESSMENT. - OTHER PERTINENT DISCHARGE PLANNING INFORMATION:
78 y/o F with PMH of HTN, DM2, Dyslipidemia, CAD s/p MI s/p PCI, and Anxiety, presented with occipital headache, uncontrolled HTN, and hyponatremia. Met patient at bedside.  Explained role of CM, verbalized understanding. Pt was made aware a CM will remain available through hospitalization.  Contact information given in discharge/ transitions resource folder.

## 2025-03-16 NOTE — CARE COORDINATION ASSESSMENT. - NSDCPLANSERVICES_GEN_ALL_CORE
DX:78 y/o F with PMH of HTN, DM2, Dyslipidemia, CAD s/p MI s/p PCI, and Anxiety, presented with occipital headache, uncontrolled HTN, and hyponatremia.        CM met with patient at bedside. Patient alert times 3 speaks Mandarin and CM used  ID#[id number].  Patient stated lives with daughter Lakeshia phone number [phone]. Patient stated has no steps to enter in the house. Patient stated owns a cane and wheelchair at home. Patient declined any homecare services CM made Provider aware.     CM verified:     PCP: Florina Deleon phone number: [phone].  Pharmacy: [facility] in [place] phone number [phone].   Insurance: &[employer]/Medicare.   [place]: NO.     CM explained about homecare services to patient with a verbal understanding. Patient declining homecare care services CM, Made Provider Aware. CM will continue to follow patient./No Anticipated Discharge Needs

## 2025-03-16 NOTE — CARE COORDINATION ASSESSMENT. - NSCAREPROVIDERS_GEN_ALL_CORE_FT
CARE PROVIDERS:  Accepting Physician: Philippe Colon  Administration: Xavi Dias  Admitting: Philippe Colon  Attending: Philippe Colon  Consultant: Eben Arana  ED Attending: Chuck Ford  ED Nurse: Cinthya Winn  Nurse: Diann Trinh  Nurse: Denise Doran  Nurse: Lynnette Perez  Nurse: Marisela Zamora  Outpatient Provider: Eben Arana  PCA/Nursing Assistant: Irma Diaz  PCA/Nursing Assistant: Gavin Zimmerman  Primary Team: Rajani Cruz  Primary Team: Klever Scott  Team: [facility], Team  UR// Supp. Assoc.: Marlen Donato

## 2025-03-16 NOTE — CONSULT NOTE ADULT - ASSESSMENT
Hyponatremia: On HCTZ  Hypertension  Anemia  Diabetes  Hypomagnesemia    Improving sodium levels. D/c IVF. D/c HCTZ and maintain off HCTZ on discharge. Trend BP and titrate BP meds as needed. Add amlodipine.   Monitor blood sugar levels. Insulin coverage as needed. Dietary restriction. Magnesium supplementation. Will follow electrolytes and renal function trend.     Further recommendations pending clinical course. Thank you for the courtesy of this referral.

## 2025-03-16 NOTE — PATIENT PROFILE ADULT - FALL HARM RISK - HARM RISK INTERVENTIONS

## 2025-03-16 NOTE — CARE COORDINATION ASSESSMENT. - PRO ARRIVE FROM
DX:80 y/o F with PMH of HTN, DM2, Dyslipidemia, CAD s/p MI s/p PCI, and Anxiety, presented with occipital headache, uncontrolled HTN, and hyponatremia.        CM met with patient at bedside. Patient alert times 3 speaks Mandarin and CM used  ID#[id number].  Patient stated lives with daughter Lakeshia phone number [phone]. Patient stated has no steps to enter in the house. Patient stated owns a cane and wheelchair at home. Patient declined any homecare services CM made Provider aware.     CM verified:     PCP: Florina Deleon phone number: [phone].  Pharmacy: [facility] in [place] phone number [phone].   Insurance: &[employer]/Medicare.   [place]: NO.     CM explained about homecare services to patient with a verbal understanding. Patient declining homecare care services CM, Made Provider Aware. CM will continue to follow patient./home

## 2025-03-16 NOTE — CARE COORDINATION ASSESSMENT. - NSPASTMEDSURGHISTORY_GEN_ALL_CORE_FT
PAST MEDICAL & SURGICAL HISTORY:  Hypertension      Diabetes      No significant past surgical history      CAD (coronary artery disease)      Dyslipidemia

## 2025-03-17 LAB
ALBUMIN SERPL ELPH-MCNC: 2.9 G/DL — LOW (ref 3.3–5)
ALP SERPL-CCNC: 83 U/L — SIGNIFICANT CHANGE UP (ref 30–120)
ALT FLD-CCNC: 30 U/L — SIGNIFICANT CHANGE UP (ref 10–60)
ANION GAP SERPL CALC-SCNC: 9 MMOL/L — SIGNIFICANT CHANGE UP (ref 5–17)
AST SERPL-CCNC: 16 U/L — SIGNIFICANT CHANGE UP (ref 10–40)
BILIRUB SERPL-MCNC: 0.3 MG/DL — SIGNIFICANT CHANGE UP (ref 0.2–1.2)
BUN SERPL-MCNC: 42 MG/DL — HIGH (ref 7–23)
CALCIUM SERPL-MCNC: 8.7 MG/DL — SIGNIFICANT CHANGE UP (ref 8.4–10.5)
CHLORIDE SERPL-SCNC: 98 MMOL/L — SIGNIFICANT CHANGE UP (ref 96–108)
CO2 SERPL-SCNC: 21 MMOL/L — LOW (ref 22–31)
CREAT SERPL-MCNC: 1.9 MG/DL — HIGH (ref 0.5–1.3)
EGFR: 27 ML/MIN/1.73M2 — LOW
GLUCOSE BLDC GLUCOMTR-MCNC: 183 MG/DL — HIGH (ref 70–99)
GLUCOSE BLDC GLUCOMTR-MCNC: 209 MG/DL — HIGH (ref 70–99)
GLUCOSE SERPL-MCNC: 149 MG/DL — HIGH (ref 70–99)
HCT VFR BLD CALC: 25.6 % — LOW (ref 34.5–45)
HGB BLD-MCNC: 8.7 G/DL — LOW (ref 11.5–15.5)
MCHC RBC-ENTMCNC: 30.6 PG — SIGNIFICANT CHANGE UP (ref 27–34)
MCHC RBC-ENTMCNC: 34 G/DL — SIGNIFICANT CHANGE UP (ref 32–36)
MCV RBC AUTO: 90.1 FL — SIGNIFICANT CHANGE UP (ref 80–100)
NRBC BLD AUTO-RTO: 0 /100 WBCS — SIGNIFICANT CHANGE UP (ref 0–0)
PLATELET # BLD AUTO: 178 K/UL — SIGNIFICANT CHANGE UP (ref 150–400)
POTASSIUM SERPL-MCNC: 4.8 MMOL/L — SIGNIFICANT CHANGE UP (ref 3.5–5.3)
POTASSIUM SERPL-SCNC: 4.8 MMOL/L — SIGNIFICANT CHANGE UP (ref 3.5–5.3)
PROT SERPL-MCNC: 6.3 G/DL — SIGNIFICANT CHANGE UP (ref 6–8.3)
RBC # BLD: 2.84 M/UL — LOW (ref 3.8–5.2)
RBC # FLD: 12.8 % — SIGNIFICANT CHANGE UP (ref 10.3–14.5)
SODIUM SERPL-SCNC: 128 MMOL/L — LOW (ref 135–145)
WBC # BLD: 5.02 K/UL — SIGNIFICANT CHANGE UP (ref 3.8–10.5)
WBC # FLD AUTO: 5.02 K/UL — SIGNIFICANT CHANGE UP (ref 3.8–10.5)

## 2025-03-17 PROCEDURE — 99232 SBSQ HOSP IP/OBS MODERATE 35: CPT

## 2025-03-17 RX ORDER — BISACODYL 5 MG
5 TABLET, DELAYED RELEASE (ENTERIC COATED) ORAL AT BEDTIME
Refills: 0 | Status: DISCONTINUED | OUTPATIENT
Start: 2025-03-17 | End: 2025-03-18

## 2025-03-17 RX ORDER — SENNA 187 MG
1 TABLET ORAL AT BEDTIME
Refills: 0 | Status: DISCONTINUED | OUTPATIENT
Start: 2025-03-17 | End: 2025-03-18

## 2025-03-17 RX ADMIN — Medication 2 GRAM(S): at 21:33

## 2025-03-17 RX ADMIN — METFORMIN HYDROCHLORIDE 1000 MILLIGRAM(S): 850 TABLET ORAL at 17:09

## 2025-03-17 RX ADMIN — Medication 0.25 MILLIGRAM(S): at 17:09

## 2025-03-17 RX ADMIN — Medication 1 TABLET(S): at 21:33

## 2025-03-17 RX ADMIN — CARVEDILOL 12.5 MILLIGRAM(S): 3.12 TABLET, FILM COATED ORAL at 17:26

## 2025-03-17 RX ADMIN — INSULIN LISPRO 4: 100 INJECTION, SOLUTION INTRAVENOUS; SUBCUTANEOUS at 12:24

## 2025-03-17 RX ADMIN — METFORMIN HYDROCHLORIDE 1000 MILLIGRAM(S): 850 TABLET ORAL at 09:04

## 2025-03-17 RX ADMIN — Medication 50 MILLILITER(S): at 14:52

## 2025-03-17 RX ADMIN — HEPARIN SODIUM 5000 UNIT(S): 1000 INJECTION INTRAVENOUS; SUBCUTANEOUS at 06:05

## 2025-03-17 RX ADMIN — LISINOPRIL 20 MILLIGRAM(S): 5 TABLET ORAL at 17:26

## 2025-03-17 RX ADMIN — AMLODIPINE BESYLATE 5 MILLIGRAM(S): 10 TABLET ORAL at 06:00

## 2025-03-17 RX ADMIN — Medication 0.25 MILLIGRAM(S): at 05:58

## 2025-03-17 RX ADMIN — LISINOPRIL 20 MILLIGRAM(S): 5 TABLET ORAL at 06:00

## 2025-03-17 RX ADMIN — INSULIN LISPRO 2: 100 INJECTION, SOLUTION INTRAVENOUS; SUBCUTANEOUS at 08:34

## 2025-03-17 RX ADMIN — CARVEDILOL 12.5 MILLIGRAM(S): 3.12 TABLET, FILM COATED ORAL at 06:00

## 2025-03-17 RX ADMIN — HEPARIN SODIUM 5000 UNIT(S): 1000 INJECTION INTRAVENOUS; SUBCUTANEOUS at 14:51

## 2025-03-17 RX ADMIN — ATORVASTATIN CALCIUM 20 MILLIGRAM(S): 80 TABLET, FILM COATED ORAL at 21:33

## 2025-03-17 RX ADMIN — HEPARIN SODIUM 5000 UNIT(S): 1000 INJECTION INTRAVENOUS; SUBCUTANEOUS at 21:32

## 2025-03-17 RX ADMIN — Medication 2 GRAM(S): at 15:43

## 2025-03-17 RX ADMIN — Medication 81 MILLIGRAM(S): at 11:45

## 2025-03-17 NOTE — PROGRESS NOTE ADULT - PROBLEM SELECTOR PLAN 2
Alprazolam  bp improved
ML CKD stage 3 as per old records review, stable, avoid nephrotoxins, and renally dose meds, monitor BUN/cr.  creatien trended upward to 1.9  nephro f/u as ptis euvolemic

## 2025-03-17 NOTE — PROGRESS NOTE ADULT - PROBLEM SELECTOR PLAN 5
controlled, Onglyza is non formulary, hold Glipizide, continue on Metformin, in addition to corrective insulin Lispro scale coverage before meals & at bedtime, check glycohemoglobin level in am.
currently controlled, hold HCTZ, continue on Carvedilol & Lisinopril with hold parameters, in addition to low dose Xanax as stated above, Cardiology consult with Dr. Nuñez was called.

## 2025-03-17 NOTE — PROGRESS NOTE ADULT - PROBLEM SELECTOR PLAN 4
currently controlled, hold HCTZ, continue on Carvedilol & Lisinopril with hold parameters, in addition to low dose Xanax as stated above, Cardiology consult with Dr. Nuñez was called.
an incidental finding on CTA, small, also a moderate to severe stenosis of right ICA, vascular surgery was consulted by ED team prior to admission, recommended outpatient f/u, ED attending explained to patient & son that they need an outpatient f/u for this issue, and can f/u with neurovascular Dr. Quintin Perry for further management as needed, Please schedule an appointment on discharge.

## 2025-03-17 NOTE — PROGRESS NOTE ADULT - PROBLEM SELECTOR PLAN 8
Started her on UFH 5000 units sub Q every 8h for DVT prophylaxis, also continue on her daily low dose Aspirin.
Started her on UFH 5000 units sub Q every 8h for DVT prophylaxis, also continue on her daily low dose Aspirin.

## 2025-03-17 NOTE — PROGRESS NOTE ADULT - PROBLEM SELECTOR PLAN 6
ML CKD stage 3 as per old records review, stable, avoid nephrotoxins, and renally dose meds, monitor BUN/cr.
controlled, Onglyza is non formulary, hold Glipizide, continue on Metformin, in addition to corrective insulin Lispro scale coverage before meals & at bedtime, check glycohemoglobin level in am.

## 2025-03-17 NOTE — PROGRESS NOTE ADULT - PROBLEM SELECTOR PLAN 3
Alprazolam  bp improved
an incidental finding on CTA, small, also a moderate to severe stenosis of right ICA, vascular surgery was consulted by ED team prior to admission, recommended outpatient f/u, ED attending explained to patient & son that they need an outpatient f/u for this issue, and can f/u with neurovascular Dr. Quintin Perry for further management as needed, Please schedule an appointment on discharge.

## 2025-03-17 NOTE — PROGRESS NOTE ADULT - PROBLEM SELECTOR PLAN 7
ML anemia of chronic disease/inflammation, stable, no reported bleeding, monitor H & H.
ML anemia of chronic disease/inflammation, stable, no reported bleeding, monitor H & H.

## 2025-03-17 NOTE — PROGRESS NOTE ADULT - PROBLEM SELECTOR PLAN 1
secondary to hctz use  hold hctz  iv fluids  hyponatremia resolving
secondary to hctz use  hold hctz  iv fluids  hyponatremia resolving

## 2025-03-18 ENCOUNTER — TRANSCRIPTION ENCOUNTER (OUTPATIENT)
Age: 80
End: 2025-03-18

## 2025-03-18 VITALS
OXYGEN SATURATION: 96 % | TEMPERATURE: 98 F | HEART RATE: 71 BPM | SYSTOLIC BLOOD PRESSURE: 134 MMHG | RESPIRATION RATE: 16 BRPM | DIASTOLIC BLOOD PRESSURE: 58 MMHG

## 2025-03-18 LAB
ALBUMIN SERPL ELPH-MCNC: 2.7 G/DL — LOW (ref 3.3–5)
ALP SERPL-CCNC: 81 U/L — SIGNIFICANT CHANGE UP (ref 30–120)
ALT FLD-CCNC: 31 U/L — SIGNIFICANT CHANGE UP (ref 10–60)
AST SERPL-CCNC: 18 U/L — SIGNIFICANT CHANGE UP (ref 10–40)
BILIRUB SERPL-MCNC: 0.2 MG/DL — SIGNIFICANT CHANGE UP (ref 0.2–1.2)
BUN SERPL-MCNC: 41 MG/DL — HIGH (ref 7–23)
CALCIUM SERPL-MCNC: 8.4 MG/DL — SIGNIFICANT CHANGE UP (ref 8.4–10.5)
CHLORIDE SERPL-SCNC: 107 MMOL/L — SIGNIFICANT CHANGE UP (ref 96–108)
CO2 SERPL-SCNC: 22 MMOL/L — SIGNIFICANT CHANGE UP (ref 22–31)
CREAT SERPL-MCNC: 1.56 MG/DL — HIGH (ref 0.5–1.3)
EGFR: 34 ML/MIN/1.73M2 — LOW
EGFR: 34 ML/MIN/1.73M2 — LOW
GLUCOSE BLDC GLUCOMTR-MCNC: 155 MG/DL — HIGH (ref 70–99)
GLUCOSE BLDC GLUCOMTR-MCNC: 166 MG/DL — HIGH (ref 70–99)
GLUCOSE BLDC GLUCOMTR-MCNC: 297 MG/DL — HIGH (ref 70–99)
GLUCOSE SERPL-MCNC: 160 MG/DL — HIGH (ref 70–99)
HCT VFR BLD CALC: 24.3 % — LOW (ref 34.5–45)
MCHC RBC-ENTMCNC: 30.6 PG — SIGNIFICANT CHANGE UP (ref 27–34)
MCHC RBC-ENTMCNC: 33.3 G/DL — SIGNIFICANT CHANGE UP (ref 32–36)
NRBC BLD AUTO-RTO: 0 /100 WBCS — SIGNIFICANT CHANGE UP (ref 0–0)
PLATELET # BLD AUTO: 189 K/UL — SIGNIFICANT CHANGE UP (ref 150–400)
POTASSIUM SERPL-MCNC: 5 MMOL/L — SIGNIFICANT CHANGE UP (ref 3.5–5.3)
POTASSIUM SERPL-SCNC: 5 MMOL/L — SIGNIFICANT CHANGE UP (ref 3.5–5.3)
RBC # BLD: 2.65 M/UL — LOW (ref 3.8–5.2)
RBC # FLD: 13.1 % — SIGNIFICANT CHANGE UP (ref 10.3–14.5)
SODIUM SERPL-SCNC: 138 MMOL/L — SIGNIFICANT CHANGE UP (ref 135–145)
WBC # BLD: 3.32 K/UL — LOW (ref 3.8–10.5)
WBC # FLD AUTO: 3.32 K/UL — LOW (ref 3.8–10.5)

## 2025-03-18 PROCEDURE — 70450 CT HEAD/BRAIN W/O DYE: CPT | Mod: MC

## 2025-03-18 PROCEDURE — 82550 ASSAY OF CK (CPK): CPT

## 2025-03-18 PROCEDURE — 36415 COLL VENOUS BLD VENIPUNCTURE: CPT

## 2025-03-18 PROCEDURE — 84443 ASSAY THYROID STIM HORMONE: CPT

## 2025-03-18 PROCEDURE — 96360 HYDRATION IV INFUSION INIT: CPT

## 2025-03-18 PROCEDURE — 80048 BASIC METABOLIC PNL TOTAL CA: CPT

## 2025-03-18 PROCEDURE — 83735 ASSAY OF MAGNESIUM: CPT

## 2025-03-18 PROCEDURE — 82962 GLUCOSE BLOOD TEST: CPT

## 2025-03-18 PROCEDURE — 99239 HOSP IP/OBS DSCHRG MGMT >30: CPT

## 2025-03-18 PROCEDURE — 85730 THROMBOPLASTIN TIME PARTIAL: CPT

## 2025-03-18 PROCEDURE — 70496 CT ANGIOGRAPHY HEAD: CPT | Mod: MC

## 2025-03-18 PROCEDURE — 80053 COMPREHEN METABOLIC PANEL: CPT

## 2025-03-18 PROCEDURE — 85027 COMPLETE CBC AUTOMATED: CPT

## 2025-03-18 PROCEDURE — 83036 HEMOGLOBIN GLYCOSYLATED A1C: CPT

## 2025-03-18 PROCEDURE — 85610 PROTHROMBIN TIME: CPT

## 2025-03-18 PROCEDURE — 99285 EMERGENCY DEPT VISIT HI MDM: CPT | Mod: 25

## 2025-03-18 PROCEDURE — 70498 CT ANGIOGRAPHY NECK: CPT | Mod: MC

## 2025-03-18 PROCEDURE — 93005 ELECTROCARDIOGRAM TRACING: CPT

## 2025-03-18 PROCEDURE — 84484 ASSAY OF TROPONIN QUANT: CPT

## 2025-03-18 PROCEDURE — 85025 COMPLETE CBC W/AUTO DIFF WBC: CPT

## 2025-03-18 RX ORDER — HYDROCHLOROTHIAZIDE 50 MG/1
1 TABLET ORAL
Refills: 0 | DISCHARGE

## 2025-03-18 RX ORDER — ATORVASTATIN CALCIUM 80 MG/1
1 TABLET, FILM COATED ORAL
Qty: 0 | Refills: 0 | DISCHARGE
Start: 2025-03-18

## 2025-03-18 RX ORDER — ATORVASTATIN CALCIUM 80 MG/1
1 TABLET, FILM COATED ORAL
Refills: 0 | DISCHARGE

## 2025-03-18 RX ADMIN — CARVEDILOL 12.5 MILLIGRAM(S): 3.12 TABLET, FILM COATED ORAL at 06:03

## 2025-03-18 RX ADMIN — METFORMIN HYDROCHLORIDE 1000 MILLIGRAM(S): 850 TABLET ORAL at 07:24

## 2025-03-18 RX ADMIN — INSULIN LISPRO 2: 100 INJECTION, SOLUTION INTRAVENOUS; SUBCUTANEOUS at 08:17

## 2025-03-18 RX ADMIN — HEPARIN SODIUM 5000 UNIT(S): 1000 INJECTION INTRAVENOUS; SUBCUTANEOUS at 06:02

## 2025-03-18 RX ADMIN — AMLODIPINE BESYLATE 5 MILLIGRAM(S): 10 TABLET ORAL at 06:03

## 2025-03-18 RX ADMIN — INSULIN LISPRO 6: 100 INJECTION, SOLUTION INTRAVENOUS; SUBCUTANEOUS at 12:05

## 2025-03-18 RX ADMIN — LISINOPRIL 20 MILLIGRAM(S): 5 TABLET ORAL at 06:03

## 2025-03-18 RX ADMIN — Medication 0.25 MILLIGRAM(S): at 06:03

## 2025-03-18 NOTE — DISCHARGE NOTE NURSING/CASE MANAGEMENT/SOCIAL WORK - NSDCPEFALRISK_GEN_ALL_CORE
For information on Fall & Injury Prevention, visit: [email or web address] OR  [email or web address] OR  https://www.cdc.gov/steadi/patient.html

## 2025-03-18 NOTE — DISCHARGE NOTE PROVIDER - PROVIDER TOKENS
Problem: Discharge Planning:  Goal: Discharged to appropriate level of care  Discharged to appropriate level of care   Outcome: Not Met This Shift      Problem: Activity Intolerance:  Goal: Ability to tolerate increased activity will improve  Ability to tolerate increased activity will improve   Outcome: Met This Shift      Problem: Pain:  Goal: Pain level will decrease  Pain level will decrease   Outcome: Met This Shift    Goal: Recognizes and communicates pain  Recognizes and communicates pain   Outcome: Met This Shift      Problem: Tissue Perfusion - Cardiopulmonary, Altered:  Goal: Hemodynamic stability will improve  Hemodynamic stability will improve   Outcome: Met This Shift      Problem: Fluid Volume:  Goal: Risk for excess fluid volume will decrease  Risk for excess fluid volume will decrease   Outcome: Met This Shift    Goal: Maintenance of adequate hydration will improve  Maintenance of adequate hydration will improve   Outcome: Met This Shift    Goal: Will show no signs and symptoms of electrolyte imbalance  Will show no signs and symptoms of electrolyte imbalance   Outcome: Met This Shift      Problem: Risk for Impaired Skin Integrity  Goal: Tissue integrity - skin and mucous membranes  Structural intactness and normal physiological function of skin and  mucous membranes.    Outcome: Met This Shift      Problem: Falls - Risk of:  Goal: Will remain free from falls  Will remain free from falls   Outcome: Met This Shift    Goal: Absence of physical injury  Absence of physical injury   Outcome: Met This Shift PROVIDER:[TOKEN:[[id number]],FOLLOWUP:[2 weeks],ESTABLISHEDPATIENT:[T]],PROVIDER:[TOKEN:[[id number]],FOLLOWUP:[1 week]],PROVIDER:[TOKEN:[[id number]],FOLLOWUP:[1 week]],PROVIDER:[TOKEN:[[id number]],FOLLOWUP:[1 month]]

## 2025-03-18 NOTE — DISCHARGE NOTE NURSING/CASE MANAGEMENT/SOCIAL WORK - PATIENT PORTAL LINK FT
You can access the FollowMyHealth Patient Portal offered by [facility] by registering at the following website: [email or web address]. By joining [employer]’s FollowMyHealth portal, you will also be able to view your health information using other applications (apps) compatible with our system.

## 2025-03-18 NOTE — DISCHARGE NOTE PROVIDER - CARE PROVIDERS DIRECT ADDRESSES
[email or web address]

## 2025-03-18 NOTE — PROGRESS NOTE ADULT - ASSESSMENT
Hyponatremia: On HCTZ  Hypertension  Anemia  Diabetes  Hypomagnesemia  NICANOR: Contrast nephropathy    03/16/25: Improving sodium levels. D/c IVF. D/c HCTZ and maintain off HCTZ on discharge. Trend BP and titrate BP meds as needed. Add amlodipine.   Monitor blood sugar levels. Insulin coverage as needed. Dietary restriction. Magnesium supplementation. Will follow electrolytes and renal function trend.   03/17/25: Worsening renal indices secondary to contrast nephropathy. Stable BP. Encourage PO intake as tolerated. Avoid nephrotoxic meds as possible. 
Hyponatremia: On HCTZ  Hypertension  Anemia  Diabetes  Hypomagnesemia  NICANOR: Contrast nephropathy    03/16/25: Improving sodium levels. D/c IVF. D/c HCTZ and maintain off HCTZ on discharge. Trend BP and titrate BP meds as needed. Add amlodipine.   Monitor blood sugar levels. Insulin coverage as needed. Dietary restriction. Magnesium supplementation. Will follow electrolytes and renal function trend.   03/17/25: Worsening renal indices secondary to contrast nephropathy. Stable BP. Encourage PO intake as tolerated. Avoid nephrotoxic meds as possible.   03/18/25; Improving renal indices. Sodium levels better. D/c IVF. Pt c/o dizziness. Recheck BP. To continue current meds. 
78 y/o F with PMH of HTN, DM2, Dyslipidemia, CAD s/p MI s/p PCI, and Anxiety, presented with occipital headache, uncontrolled HTN, and hyponatremia.
78 y/o F with PMH of HTN, DM2, Dyslipidemia, CAD s/p MI s/p PCI, and Anxiety, presented with occipital headache, uncontrolled HTN, and hyponatremia.

## 2025-03-18 NOTE — PROGRESS NOTE ADULT - SUBJECTIVE AND OBJECTIVE BOX
[facility]                                                       Dr. Arana, Dr. Lomeli, Dr. Christy, Dr. Vuong, Dr. Huitron, [facility]. [facility], [address]                                                 [address] [address]                                      Ph: [phone]  Fax: [phone]                                         Ph: [phone]  Fax: [phone]      Patient is a 79y old  Female who presents with a chief complaint of Neck pain & high BP. (17 Mar 2025 10:59)  Patient seen in follow up for hyponatremia.        PAST MEDICAL HISTORY:  Diabetes    Hypertension    Dyslipidemia    CAD (coronary artery disease)      MEDICATIONS  (STANDING):  ALPRAZolam 0.25 milliGRAM(s) Oral two times a day  amLODIPine   Tablet 5 milliGRAM(s) Oral daily  aspirin enteric coated 81 milliGRAM(s) Oral daily  atorvastatin 20 milliGRAM(s) Oral at bedtime  carvedilol 12.5 milliGRAM(s) Oral every 12 hours  dextrose 5%. 1000 milliLiter(s) (100 mL/Hr) IV Continuous <Continuous>  dextrose 5%. 1000 milliLiter(s) (50 mL/Hr) IV Continuous <Continuous>  dextrose 50% Injectable 25 Gram(s) IV Push once  dextrose 50% Injectable 12.5 Gram(s) IV Push once  dextrose 50% Injectable 25 Gram(s) IV Push once  glucagon  Injectable 1 milliGRAM(s) IntraMuscular once  heparin   Injectable 5000 Unit(s) SubCutaneous every 8 hours  insulin lispro (ADMELOG) corrective regimen sliding scale   SubCutaneous three times a day before meals  insulin lispro (ADMELOG) corrective regimen sliding scale   SubCutaneous at bedtime  lisinopril 20 milliGRAM(s) Oral two times a day  metFORMIN 1000 milliGRAM(s) Oral two times a day    MEDICATIONS  (PRN):  bisacodyl 5 milliGRAM(s) Oral at bedtime PRN Constipation  dextrose Oral Gel 15 Gram(s) Oral once PRN Blood Glucose LESS THAN 70 milliGRAM(s)/deciliter  senna 1 Tablet(s) Oral at bedtime PRN Constipation    T(C): 36.6 (03-18-25 @ 08:01), Max: 36.8 (03-18-25 @ 00:22)  HR: 63 (03-18-25 @ 08:01) (63 - 84)  BP: 130/63 (03-18-25 @ 08:01) (114/65 - 137/66)  RR: 18 (03-18-25 @ 08:01)  SpO2: 98% (03-18-25 @ 08:01)  Wt(kg): --  I&O's Detail    17 Mar 2025 07:01  -  18 Mar 2025 07:00  --------------------------------------------------------  IN:    sodium chloride 0.9%: 800 mL  Total IN: 800 mL    OUT:    Voided (mL): 900 mL  Total OUT: 900 mL    Total NET: -100 mL            PHYSICAL EXAM:  General: No distress  Respiratory: b/l air entry  Cardiovascular: S1 S2  Gastrointestinal: soft  Extremities:  no edema                          LABORATORY:                        8.1    3.32  )-----------( 189      ( 18 Mar 2025 06:00 )             24.3     03-18    138  |  107  |  41[H]  ----------------------------<  160[H]  5.0   |  22  |  1.56[H]    Ca    8.4      18 Mar 2025 06:00    TPro  6.0  /  Alb  2.7[L]  /  TBili  0.2  /  DBili  x   /  AST  18  /  ALT  31  /  AlkPhos  81  03-18    Sodium: 138 mmol/L (03-18 @ 06:00)  Sodium: 128 mmol/L (03-17 @ 06:30)    Potassium: 5.0 mmol/L (03-18 @ 06:00)  Potassium: 4.8 mmol/L (03-17 @ 06:30)    Hemoglobin: 8.1 g/dL (03-18 @ 06:00)  Hemoglobin: 8.7 g/dL (03-17 @ 06:30)  Hemoglobin: 8.8 g/dL (03-16 @ 06:00)  Hemoglobin: 8.7 g/dL (03-15 @ 18:40)    Creatinine, Serum 1.56 (03-18 @ 06:00)  Creatinine, Serum 1.90 (03-17 @ 06:30)  Creatinine, Serum 1.29 (03-16 @ 06:00)  Creatinine, Serum 1.37 (03-15 @ 18:40)        LIVER FUNCTIONS - ( 18 Mar 2025 06:00 )  Alb: 2.7 g/dL / Pro: 6.0 g/dL / ALK PHOS: 81 U/L / ALT: 31 U/L / AST: 18 U/L / GGT: x           Urinalysis Basic - ( 18 Mar 2025 06:00 )    Color: x / Appearance: x / SG: x / pH: x  Gluc: 160 mg/dL / Ketone: x  / Bili: x / Urobili: x   Blood: x / Protein: x / Nitrite: x   Leuk Esterase: x / RBC: x / WBC x   Sq Epi: x / Non Sq Epi: x / Bacteria: x      
                                                                              [facility]                                                       Dr. Arana, Dr. Lomeli, Dr. Christy, Dr. Vuong, Dr. Huitron, Dr. Donis                                      [facility], [facility], [address]                                                 [address] [address]                                      Ph: [phone]  Fax: [phone]                                         Ph: [phone]  Fax: [phone]      Patient is a 79y old  Female who presents with a chief complaint of Neck pain & high BP. (17 Mar 2025 10:59)  Patient seen in follow up for hyponatremia.        PAST MEDICAL HISTORY:  Diabetes    Hypertension    Dyslipidemia    CAD (coronary artery disease)      MEDICATIONS  (STANDING):  ALPRAZolam 0.25 milliGRAM(s) Oral two times a day  amLODIPine   Tablet 5 milliGRAM(s) Oral daily  aspirin enteric coated 81 milliGRAM(s) Oral daily  atorvastatin 20 milliGRAM(s) Oral at bedtime  carvedilol 12.5 milliGRAM(s) Oral every 12 hours  dextrose 5%. 1000 milliLiter(s) (100 mL/Hr) IV Continuous <Continuous>  dextrose 5%. 1000 milliLiter(s) (50 mL/Hr) IV Continuous <Continuous>  dextrose 50% Injectable 25 Gram(s) IV Push once  dextrose 50% Injectable 12.5 Gram(s) IV Push once  dextrose 50% Injectable 25 Gram(s) IV Push once  glucagon  Injectable 1 milliGRAM(s) IntraMuscular once  heparin   Injectable 5000 Unit(s) SubCutaneous every 8 hours  insulin lispro (ADMELOG) corrective regimen sliding scale   SubCutaneous three times a day before meals  insulin lispro (ADMELOG) corrective regimen sliding scale   SubCutaneous at bedtime  lisinopril 20 milliGRAM(s) Oral two times a day  metFORMIN 1000 milliGRAM(s) Oral two times a day    MEDICATIONS  (PRN):  dextrose Oral Gel 15 Gram(s) Oral once PRN Blood Glucose LESS THAN 70 milliGRAM(s)/deciliter    T(C): 36.3 (03-17-25 @ 07:28), Max: 36.7 (03-16-25 @ 09:18)  HR: 66 (03-17-25 @ 07:28) (66 - 81)  BP: 121/67 (03-17-25 @ 07:28) (114/65 - 174/65)  RR: 18 (03-17-25 @ 07:28) (16 - 18)  SpO2: 99% (03-17-25 @ 07:28) (96% - 99%)  Wt(kg): --  I&O's Detail    16 Mar 2025 07:01  -  17 Mar 2025 07:00  --------------------------------------------------------  IN:  Total IN: 0 mL    OUT:    Voided (mL): 650 mL  Total OUT: 650 mL    Total NET: -650 mL          PHYSICAL EXAM:  General: No distress  Respiratory: b/l air entry  Cardiovascular: S1 S2  Gastrointestinal: soft  Extremities:  no edema                              8.7    5.02  )-----------( 178      ( 17 Mar 2025 06:30 )             25.6     03-17    128[L]  |  98  |  42[H]  ----------------------------<  149[H]  4.8   |  21[L]  |  1.90[H]    Ca    8.7      17 Mar 2025 06:30  Mg     1.5     03-16    TPro  6.3  /  Alb  2.9[L]  /  TBili  0.3  /  DBili  x   /  AST  16  /  ALT  30  /  AlkPhos  83  03-17        LIVER FUNCTIONS - ( 17 Mar 2025 06:30 )  Alb: 2.9 g/dL / Pro: 6.3 g/dL / ALK PHOS: 83 U/L / ALT: 30 U/L / AST: 16 U/L / GGT: x           Urinalysis Basic - ( 17 Mar 2025 06:30 )    Color: x / Appearance: x / SG: x / pH: x  Gluc: 149 mg/dL / Ketone: x  / Bili: x / Urobili: x   Blood: x / Protein: x / Nitrite: x   Leuk Esterase: x / RBC: x / WBC x   Sq Epi: x / Non Sq Epi: x / Bacteria: x        Sodium, Serum: 128 (03-17 @ 06:30)  Sodium, Serum: 129 (03-16 @ 06:00)  Sodium, Serum: 122 (03-15 @ 18:40)    Creatinine, Serum: 1.90 (03-17 @ 06:30)  Creatinine, Serum: 1.29 (03-16 @ 06:00)  Creatinine, Serum: 1.37 (03-15 @ 18:40)    Potassium, Serum: 4.8 (03-17 @ 06:30)  Potassium, Serum: 3.8 (03-16 @ 06:00)  Potassium, Serum: 4.6 (03-15 @ 18:40)    Hemoglobin: 8.7 (03-17 @ 06:30)  Hemoglobin: 8.8 (03-16 @ 06:00)  Hemoglobin: 8.7 (03-15 @ 18:40)        
Patient is a 79y old  Female who presents with a chief complaint of Neck pain & high BP. (16 Mar 2025 13:22)        HPI:  This is a 78 y/o F with PMH of HTN, DM2, Dyslipidemia, CAD s/p MI s/p PCI, and Anxiety who presented with 3 days of occipital headache radiating to her upper back of the neck, no associated visual changes, abnormal sensation, focal muscle weakness, or changes of speech. Patient was found with elevated BP, denies any medication non compliance, but reportedly on compliant with diet restriction. AT the ED her BP was 200/78, serum sodium level of 122 mmol/L, and her CTA head & neck showed a possible small saccular aneurysm in addition to a moderate to severe stenosis of right ICA. Patient currently feels better but still with discomfort at her upper posterior neck. (15 Mar 2025 21:20)      SUBJECTIVE & OBJECTIVE: Pt seen and examined at bedside. nad    PHYSICAL EXAM:  T(C): 36.3 (03-17-25 @ 07:28), Max: 36.7 (03-16-25 @ 15:26)  HR: 66 (03-17-25 @ 07:28) (66 - 76)  BP: 121/67 (03-17-25 @ 07:28) (114/65 - 137/66)  RR: 18 (03-17-25 @ 07:28) (16 - 18)  SpO2: 99% (03-17-25 @ 07:28) (98% - 99%)  Wt(kg): --   GENERAL: NAD, well-groomed, well-developed  NECK: Supple, No JVD  NERVOUS SYSTEM:  Alert & Oriented X3, Motor Strength 5/5 B/L upper and lower extremities; DTRs 2+ intact and symmetric  CHEST/LUNG: Clear to auscultation bilaterally; No rales, rhonchi, wheezing, or rubs  HEART: Regular rate and rhythm; No murmurs, rubs, or gallops  ABDOMEN: Soft, Nontender, Nondistended; Bowel sounds present  EXTREMITIES:  2+ Peripheral Pulses, No clubbing, cyanosis, or edema        MEDICATIONS  (STANDING):  ALPRAZolam 0.25 milliGRAM(s) Oral two times a day  amLODIPine   Tablet 5 milliGRAM(s) Oral daily  aspirin enteric coated 81 milliGRAM(s) Oral daily  atorvastatin 20 milliGRAM(s) Oral at bedtime  carvedilol 12.5 milliGRAM(s) Oral every 12 hours  dextrose 5%. 1000 milliLiter(s) (100 mL/Hr) IV Continuous <Continuous>  dextrose 5%. 1000 milliLiter(s) (50 mL/Hr) IV Continuous <Continuous>  dextrose 50% Injectable 25 Gram(s) IV Push once  dextrose 50% Injectable 12.5 Gram(s) IV Push once  dextrose 50% Injectable 25 Gram(s) IV Push once  glucagon  Injectable 1 milliGRAM(s) IntraMuscular once  heparin   Injectable 5000 Unit(s) SubCutaneous every 8 hours  insulin lispro (ADMELOG) corrective regimen sliding scale   SubCutaneous three times a day before meals  insulin lispro (ADMELOG) corrective regimen sliding scale   SubCutaneous at bedtime  lisinopril 20 milliGRAM(s) Oral two times a day  metFORMIN 1000 milliGRAM(s) Oral two times a day    MEDICATIONS  (PRN):  dextrose Oral Gel 15 Gram(s) Oral once PRN Blood Glucose LESS THAN 70 milliGRAM(s)/deciliter      LABS:                        8.7    5.02  )-----------( 178      ( 17 Mar 2025 06:30 )             25.6     03-17    128[L]  |  98  |  42[H]  ----------------------------<  149[H]  4.8   |  21[L]  |  1.90[H]    Ca    8.7      17 Mar 2025 06:30  Mg     1.5     03-16    TPro  6.3  /  Alb  2.9[L]  /  TBili  0.3  /  DBili  x   /  AST  16  /  ALT  30  /  AlkPhos  83  03-17    PT/INR - ( 15 Mar 2025 18:40 )   PT: 10.4 sec;   INR: 0.88 ratio         PTT - ( 15 Mar 2025 18:40 )  PTT:32.3 sec  Urinalysis Basic - ( 17 Mar 2025 06:30 )    Color: x / Appearance: x / SG: x / pH: x  Gluc: 149 mg/dL / Ketone: x  / Bili: x / Urobili: x   Blood: x / Protein: x / Nitrite: x   Leuk Esterase: x / RBC: x / WBC x   Sq Epi: x / Non Sq Epi: x / Bacteria: x        CAPILLARY BLOOD GLUCOSE      POCT Blood Glucose.: 151 mg/dL (17 Mar 2025 07:39)  POCT Blood Glucose.: 180 mg/dL (16 Mar 2025 21:30)  POCT Blood Glucose.: 148 mg/dL (16 Mar 2025 17:09)  POCT Blood Glucose.: 183 mg/dL (16 Mar 2025 12:27)      CAPILLARY BLOOD GLUCOSE      POCT Blood Glucose.: 151 mg/dL (17 Mar 2025 07:39)  POCT Blood Glucose.: 180 mg/dL (16 Mar 2025 21:30)  POCT Blood Glucose.: 148 mg/dL (16 Mar 2025 17:09)  POCT Blood Glucose.: 183 mg/dL (16 Mar 2025 12:27)    CAPILLARY BLOOD GLUCOSE      POCT Blood Glucose.: 151 mg/dL (17 Mar 2025 07:39)            RECENT CULTURES:      RADIOLOGY & ADDITIONAL TESTS:                        DVT/GI ppx  Discussed with pt @ bedside
Patient is a 79y old  Female who presents with a chief complaint of Neck pain & high BP. (16 Mar 2025 10:25)        HPI:  This is a 80 y/o F with PMH of HTN, DM2, Dyslipidemia, CAD s/p MI s/p PCI, and Anxiety who presented with 3 days of occipital headache radiating to her upper back of the neck, no associated visual changes, abnormal sensation, focal muscle weakness, or changes of speech. Patient was found with elevated BP, denies any medication non compliance, but reportedly on compliant with diet restriction. AT the ED her BP was 200/78, serum sodium level of 122 mmol/L, and her CTA head & neck showed a possible small saccular aneurysm in addition to a moderate to severe stenosis of right ICA. Patient currently feels better but still with discomfort at her upper posterior neck. (15 Mar 2025 21:20)      SUBJECTIVE & OBJECTIVE: Pt seen and examined at bedside. nad    PHYSICAL EXAM:  T(C): 36.7 (03-16-25 @ 09:18), Max: 37.1 (03-15-25 @ 21:58)  HR: 75 (03-16-25 @ 09:18) (75 - 90)  BP: 157/75 (03-16-25 @ 09:18) (147/68 - 200/78)  RR: 16 (03-16-25 @ 09:18) (14 - 18)  SpO2: 99% (03-16-25 @ 09:18) (96% - 100%)  Wt(kg): -- Height (cm): 160 (03-15 @ 17:55)  Weight (kg): 68 (03-15 @ 17:55)  BMI (kg/m2): 26.6 (03-15 @ 17:55)  BSA (m2): 1.71 (03-15 @ 17:55)  GENERAL: NAD, well-groomed, well-developed  NECK: Supple, No JVD  NERVOUS SYSTEM:  Alert & Oriented X3,   CHEST/LUNG: Clear to auscultation bilaterally; No rales, rhonchi, wheezing, or rubs  HEART: Regular rate and rhythm; No murmurs, rubs, or gallops  ABDOMEN: Soft, Nontender, Nondistended; Bowel sounds present  EXTREMITIES:  2+ Peripheral Pulses, No clubbing, cyanosis, or edema        MEDICATIONS  (STANDING):  ALPRAZolam 0.25 milliGRAM(s) Oral two times a day  amLODIPine   Tablet 5 milliGRAM(s) Oral daily  aspirin enteric coated 81 milliGRAM(s) Oral daily  atorvastatin 20 milliGRAM(s) Oral at bedtime  carvedilol 12.5 milliGRAM(s) Oral every 12 hours  dextrose 5%. 1000 milliLiter(s) (100 mL/Hr) IV Continuous <Continuous>  dextrose 5%. 1000 milliLiter(s) (50 mL/Hr) IV Continuous <Continuous>  dextrose 50% Injectable 25 Gram(s) IV Push once  dextrose 50% Injectable 12.5 Gram(s) IV Push once  dextrose 50% Injectable 25 Gram(s) IV Push once  glucagon  Injectable 1 milliGRAM(s) IntraMuscular once  heparin   Injectable 5000 Unit(s) SubCutaneous every 8 hours  insulin lispro (ADMELOG) corrective regimen sliding scale   SubCutaneous three times a day before meals  insulin lispro (ADMELOG) corrective regimen sliding scale   SubCutaneous at bedtime  lisinopril 20 milliGRAM(s) Oral two times a day  metFORMIN 1000 milliGRAM(s) Oral two times a day    MEDICATIONS  (PRN):  dextrose Oral Gel 15 Gram(s) Oral once PRN Blood Glucose LESS THAN 70 milliGRAM(s)/deciliter      LABS:                        8.8    3.74  )-----------( 193      ( 16 Mar 2025 06:00 )             25.6     03-16    129[L]  |  95[L]  |  28[H]  ----------------------------<  84  3.8   |  22  |  1.29    Ca    8.6      16 Mar 2025 06:00  Mg     1.5     03-16    TPro  7.0  /  Alb  3.4  /  TBili  0.5  /  DBili  x   /  AST  19  /  ALT  34  /  AlkPhos  83  03-15    PT/INR - ( 15 Mar 2025 18:40 )   PT: 10.4 sec;   INR: 0.88 ratio         PTT - ( 15 Mar 2025 18:40 )  PTT:32.3 sec  Urinalysis Basic - ( 16 Mar 2025 06:00 )    Color: x / Appearance: x / SG: x / pH: x  Gluc: 84 mg/dL / Ketone: x  / Bili: x / Urobili: x   Blood: x / Protein: x / Nitrite: x   Leuk Esterase: x / RBC: x / WBC x   Sq Epi: x / Non Sq Epi: x / Bacteria: x      Magnesium: 1.5 mg/dL (03-16 @ 06:00)    CAPILLARY BLOOD GLUCOSE      POCT Blood Glucose.: 183 mg/dL (16 Mar 2025 12:27)  POCT Blood Glucose.: 104 mg/dL (16 Mar 2025 08:10)  POCT Blood Glucose.: 81 mg/dL (16 Mar 2025 02:14)      CAPILLARY BLOOD GLUCOSE      POCT Blood Glucose.: 183 mg/dL (16 Mar 2025 12:27)  POCT Blood Glucose.: 104 mg/dL (16 Mar 2025 08:10)  POCT Blood Glucose.: 81 mg/dL (16 Mar 2025 02:14)    CAPILLARY BLOOD GLUCOSE      POCT Blood Glucose.: 183 mg/dL (16 Mar 2025 12:27)            RECENT CULTURES:      RADIOLOGY & ADDITIONAL TESTS:                        DVT/GI ppx  Discussed with pt @ bedside

## 2025-03-18 NOTE — DISCHARGE NOTE PROVIDER - NSDCCPCAREPLAN_GEN_ALL_CORE_FT
PRINCIPAL DISCHARGE DIAGNOSIS  Diagnosis: Hyponatremia  Assessment and Plan of Treatment: 2/2 hctz use. stopped. now resolved. follow up with pcp in 1-2 weeks for bp control      SECONDARY DISCHARGE DIAGNOSES  Diagnosis: Saccular aneurysm  Assessment and Plan of Treatment: incidental finding, follow up with neurovascular in 2-4 weeks    Diagnosis: Acute tension-type headache  Assessment and Plan of Treatment: resolved with xanax

## 2025-03-18 NOTE — DISCHARGE NOTE PROVIDER - ATTENDING DISCHARGE PHYSICAL EXAMINATION:
Vital Signs Last 24 Hrs  T(C): 36.6 (18 Mar 2025 08:01), Max: 36.8 (18 Mar 2025 00:22)  T(F): 97.8 (18 Mar 2025 08:01), Max: 98.2 (18 Mar 2025 00:22)  HR: 63 (18 Mar 2025 08:01) (63 - 84)  BP: 130/63 (18 Mar 2025 08:01) (130/63 - 135/64)  BP(mean): --  RR: 18 (18 Mar 2025 08:01) (16 - 18)  SpO2: 98% (18 Mar 2025 08:01) (97% - 99%)    Parameters below as of 18 Mar 2025 08:01  Patient On (Oxygen Delivery Method): room air    VSS  CONSTITUTIONAL: Well-developed; well-nourished; in no acute distress.  SKIN: Skin exam is warm and dry, no acute rash.  HEAD: Normocephalic; atraumatic.  EYES: PERRL, EOM intact; conjunctiva and sclera clear.  ENT: No nasal discharge; airway clear. TMs clear.  NECK: Supple; non tender.  CARD: S1, S2 normal; no murmurs, gallops, or rubs. Regular rate and rhythm.  RESP: No wheezes, rales or rhonchi.  ABD: Normal bowel sounds; soft; non-distended; non-tender; no hepatosplenomegaly.  EXT: Normal ROM. No clubbing, cyanosis or edema.  LYMPH: No acute cervical adenopathy.  NEURO: Alert, oriented. Grossly unremarkable. No focal deficits.  PSYCH: Cooperative, appropriate.

## 2025-03-18 NOTE — DISCHARGE NOTE NURSING/CASE MANAGEMENT/SOCIAL WORK - NSDCVIVACCINE_GEN_ALL_CORE_FT
Tdap; 10-Apr-2019 23:16; Jason Gardner (HAILEY); [employer]; [id number] (Exp. Date: 26-Feb-2021); IntraMuscular; Deltoid Right.; 0.5 milliLiter(s); VIS (VIS Published: 09-May-2013, VIS Presented: 10-Apr-2019);

## 2025-03-18 NOTE — CASE MANAGEMENT PROGRESS NOTE - NSCMPROGRESSNOTE_GEN_ALL_CORE
TRANSITION OF CARE PLAN UPDATE  RN JAIDA met with patient at bedside to verify if she is aware she is being DC'd today 11/28/2025; patient verbalized she is, informed patient that home care services via [facility] has been arranged on her behalf, patient requested writer to call her daughter Lakeshia [phone]; telephone call to Lakeshia; no ; left detailed message about patient DC orders, and home care arrangements; instructed to call [phone] to speak to patients nurse to arrange DC pickup; Staff RN aware;

## 2025-03-18 NOTE — DISCHARGE NOTE NURSING/CASE MANAGEMENT/SOCIAL WORK - NSSCTYPOFSERV_GEN_ALL_CORE
[facility]/ Home Care Services with [facility] ( / [phone] )  Home Care RN will call within 24/48 hrs of DC from the hospital to set up Initial Assessment.

## 2025-03-18 NOTE — DISCHARGE NOTE PROVIDER - CARE PROVIDER_API CALL
Pancho Hardy  [facility]  [address], [address]  [place] [address]  Phone: [phone]  Fax: [phone]  Established Patient  Follow Up Time: 2 weeks    Eben Arana  Nephrology  [address], [address]  [place] [address]  Phone: [phone]  Fax: [phone]  Follow Up Time: 1 week    Jefe Flynn  Cardiology  [address], [address]  [place] [address]  Phone: [phone]  Fax: [phone]  Follow Up Time: 1 week    Quintin Perryul  Neurosurgery  [address] [place] [address]  Phone: [phone]  Fax: [phone]  Follow Up Time: 1 month

## 2025-03-18 NOTE — DISCHARGE NOTE PROVIDER - HOSPITAL COURSE
80 y/o F with PMH of HTN, DM2, Dyslipidemia, CAD s/p MI s/p PCI, and Anxiety who presented with 3 days of occipital headache radiating to her upper back of the neck, no associated visual changes, abnormal sensation, focal muscle weakness, or changes of speech. Patient was found with elevated BP, denies any medication non compliance, but reportedly on compliant with diet restriction. AT the ED her BP was 200/78, serum sodium level of 122 mmol/L, and her CTA head & neck showed a possible small saccular aneurysm in addition to a moderate to severe stenosis of right ICA.    Admitted for the following:      Hyponatremia secondary to hctz use  hold hctz  resolved with IVF, seen by nephrology        Abnormal kidney function.  -mildly labile, baseline ~ 1.3-4, currently at 1.5  follow up with nephrology as outpatient    HTN urgency - controlled on lisinopril, carvedilol, had been started on amlodipine but having some lightheadedness so stopping. follow up with pcp/cardiology in 1-2 weeks for BP control.       Saccular aneurysm.   ·  Plan: an incidental finding on CTA, small, also a moderate to severe stenosis of right ICA, vascular surgery was consulted by ED team prior to admission, recommended outpatient f/u,  f/u with neurovascular Dr. Quintin Perry for further management as needed, Please schedule an appointment on discharge.         DM2 (diabetes mellitus, type 2).   continue home regimen     Normocytic anemia.   ·  Plan: ML anemia of chronic disease/inflammation, stable, no reported bleeding

## 2025-03-18 NOTE — DISCHARGE NOTE NURSING/CASE MANAGEMENT/SOCIAL WORK - FINANCIAL ASSISTANCE
[facility] provides services at a reduced cost to those who are determined to be eligible through [facility]’s financial assistance program. Information regarding [facility]’s financial assistance program can be found by going to [email or web address] or by calling [phone].

## 2025-03-18 NOTE — DISCHARGE NOTE PROVIDER - NSDCMRMEDTOKEN_GEN_ALL_CORE_FT
aspirin 81 mg oral tablet: 1 tab(s) orally once a day  atorvastatin 20 mg oral tablet: 1 tab(s) orally once a day (at bedtime)  carvedilol 12.5 mg oral tablet: 1 tab(s) orally 2 times a day  glipiZIDE 5 mg oral tablet: 1 tab(s) orally once a day  lisinopril 20 mg oral tablet: 1 tab(s) orally 2 times a day  metFORMIN 1000 mg oral tablet: 1 tab(s) orally 2 times a day  Onglyza 5 mg oral tablet: 1 tab(s) orally once a day

## 2025-04-10 ENCOUNTER — NON-APPOINTMENT (OUTPATIENT)
Age: 80
End: 2025-04-10

## 2025-04-10 ENCOUNTER — APPOINTMENT (OUTPATIENT)
Dept: NEUROSURGERY | Facility: CLINIC | Age: 80
End: 2025-04-10
Payer: MEDICARE

## 2025-04-10 VITALS
OXYGEN SATURATION: 100 % | TEMPERATURE: 97.3 F | HEIGHT: 61 IN | DIASTOLIC BLOOD PRESSURE: 69 MMHG | WEIGHT: 145 LBS | HEART RATE: 74 BPM | SYSTOLIC BLOOD PRESSURE: 152 MMHG | BODY MASS INDEX: 27.38 KG/M2

## 2025-04-10 DIAGNOSIS — I67.1 CEREBRAL ANEURYSM, NONRUPTURED: ICD-10-CM

## 2025-04-10 PROCEDURE — 99205 OFFICE O/P NEW HI 60 MIN: CPT

## 2025-04-24 ENCOUNTER — DOCTOR'S OFFICE (OUTPATIENT)
Facility: LOCATION | Age: 80
Setting detail: OPHTHALMOLOGY
End: 2025-04-24
Payer: MEDICARE

## 2025-04-24 DIAGNOSIS — H43.391: ICD-10-CM

## 2025-04-24 DIAGNOSIS — H35.371: ICD-10-CM

## 2025-04-24 DIAGNOSIS — E11.3591: ICD-10-CM

## 2025-04-24 DIAGNOSIS — E11.3592: ICD-10-CM

## 2025-04-24 DIAGNOSIS — H35.3132: ICD-10-CM

## 2025-04-24 PROCEDURE — 99213 OFFICE O/P EST LOW 20 MIN: CPT | Performed by: OPHTHALMOLOGY

## 2025-04-24 PROCEDURE — 92134 CPTRZ OPH DX IMG PST SGM RTA: CPT | Performed by: OPHTHALMOLOGY

## 2025-04-24 ASSESSMENT — REFRACTION_AUTOREFRACTION
OD_CYLINDER: -1.75
OD_AXIS: 88
OS_SPHERE: +1.75
OD_SPHERE: +2.50
OS_CYLINDER: -1.50
OS_AXIS: 81

## 2025-04-24 ASSESSMENT — LID EXAM ASSESSMENTS
OD_MEIBOMITIS: 1+
OS_MEIBOMITIS: 1+

## 2025-04-24 ASSESSMENT — KERATOMETRY
OS_AXISANGLE_DEGREES: 3
OD_AXISANGLE_DEGREES: 158
OD_K2POWER_DIOPTERS: 44.00
OD_K1POWER_DIOPTERS: 43.62
OS_K1POWER_DIOPTERS: 42.87
OS_K2POWER_DIOPTERS: 44.25

## 2025-04-24 ASSESSMENT — VISUAL ACUITY
OD_BCVA: 20/30-1
OS_BCVA: 20/40-1

## 2025-04-24 ASSESSMENT — CONFRONTATIONAL VISUAL FIELD TEST (CVF)
OD_FINDINGS: FULL
OS_FINDINGS: FULL

## 2025-08-27 ENCOUNTER — APPOINTMENT (OUTPATIENT)
Dept: OTOLARYNGOLOGY | Facility: CLINIC | Age: 80
End: 2025-08-27

## 2025-08-27 ENCOUNTER — APPOINTMENT (OUTPATIENT)
Dept: OTOLARYNGOLOGY | Facility: CLINIC | Age: 80
End: 2025-08-27
Payer: MEDICARE

## 2025-08-27 VITALS
DIASTOLIC BLOOD PRESSURE: 88 MMHG | BODY MASS INDEX: 27.38 KG/M2 | HEIGHT: 61 IN | WEIGHT: 145 LBS | SYSTOLIC BLOOD PRESSURE: 186 MMHG

## 2025-08-27 DIAGNOSIS — R04.0 EPISTAXIS: ICD-10-CM

## 2025-08-27 DIAGNOSIS — J34.89 OTHER SPECIFIED DISORDERS OF NOSE AND NASAL SINUSES: ICD-10-CM

## 2025-08-27 PROCEDURE — 31231 NASAL ENDOSCOPY DX: CPT

## 2025-08-27 PROCEDURE — 99204 OFFICE O/P NEW MOD 45 MIN: CPT | Mod: 25
